# Patient Record
Sex: MALE | Race: WHITE | NOT HISPANIC OR LATINO | Employment: OTHER | URBAN - METROPOLITAN AREA
[De-identification: names, ages, dates, MRNs, and addresses within clinical notes are randomized per-mention and may not be internally consistent; named-entity substitution may affect disease eponyms.]

---

## 2017-04-24 ENCOUNTER — HOSPITAL ENCOUNTER (EMERGENCY)
Facility: CLINIC | Age: 67
Discharge: HOME | End: 2017-04-24
Attending: FAMILY MEDICINE | Admitting: FAMILY MEDICINE
Payer: MEDICARE

## 2017-04-24 VITALS
OXYGEN SATURATION: 92 % | HEART RATE: 101 BPM | DIASTOLIC BLOOD PRESSURE: 96 MMHG | RESPIRATION RATE: 20 BRPM | SYSTOLIC BLOOD PRESSURE: 134 MMHG

## 2017-04-24 DIAGNOSIS — R04.0 EPISTAXIS: ICD-10-CM

## 2017-04-24 PROCEDURE — 99283 EMERGENCY DEPT VISIT LOW MDM: CPT | Performed by: FAMILY MEDICINE

## 2017-04-24 PROCEDURE — 99283 EMERGENCY DEPT VISIT LOW MDM: CPT

## 2017-04-24 RX ORDER — TRAZODONE HYDROCHLORIDE 50 MG/1
50 TABLET, FILM COATED ORAL AT BEDTIME
COMMUNITY
Start: 2016-09-09

## 2017-04-24 RX ORDER — ATORVASTATIN CALCIUM 40 MG/1
40 TABLET, FILM COATED ORAL DAILY
COMMUNITY
Start: 2016-12-08

## 2017-04-24 NOTE — ED AVS SNAPSHOT
[facility] Emergency Department    [address] [address]    Phone:  [phone]    Fax:  [phone]                                       Stuart Ortega   MRN: [id number]    Department:  [facility] Emergency Department   Date of Visit:  4/24/2017           Patient Information     Date Of Birth          1950        Your diagnoses for this visit were:     Epistaxis        You were seen by Cameron Timmons MD.        Discharge Instructions       Return to the Emergency Room if the following occurs:     Worsened bleeding, fainting, new chest pain, new shortness of breath, or for any concern at anytime.    Or, follow-up with the following provider as we discussed:     Consider follow up with ENT if bleeding continues to recur.  Call [phone] to schedule.    Medications discussed:    For new nasal bleeding, apply the clip to the soft portion of your nose for 15-20 minutes.  When complete, removed the clip and check for bleeding.  If the bleeding continues, reapply the clip and wait another 15-20 minutes.  If the bleeding continues beyond that time, return to the ER as suggested above.    If you received pain-relieving or sedating medication during your time in the ER, avoid alcohol, driving automobiles, or working with machinery.  Also, a responsible adult must stay with you.      If you had X-rays or labs done we will attempt to contact you if there is a change needed in your care.      Call the Nurse Advice Line at [phone] or [phone] for any concern at anytime.      24 Hour Appointment Hotline       To make an appointment at any [facility], call [phone] ([phone]). If you don't have a family doctor or clinic, we will help you find one. [facility] are conveniently located to serve the needs of you and your family.             Review of your medicines      Our records show that you are taking the medicines listed below. If these are  incorrect, please call your family doctor or clinic.        Dose / Directions Last dose taken    aspirin 81 MG tablet        1 TABLET DAILY   Refills:  0        CHLORPHENIRAMINE MALEATE CR 8 MG Tbcr        1 TABLET EVERY 8 TO 12 HOURS AS NEEDED   Refills:  0        FIBER PO        3 capsules three times daily   Refills:  0        FLEXERIL 10 MG tablet   Generic drug:  cyclobenzaprine        1 TABLET 3 TIMES DAILY   Refills:  0        FLONASE 50 MCG/DOSE nasal spray   Quantity:  1 Container        INHALE 2 SPRAYS IN EACH NOSTRIL ONCE DAILY return to clinic for refills   Refills:  0        fluocinonide 0.05 % ointment   Commonly known as:  LIDEX   Quantity:  30 g        Apply topically 2 times daily > 1 year since seen in Derm Clinic last on 10-. Needs to be seen before any further refills authorized. [phone] to schedule. Thanks.   Refills:  0        HYDROmorphone 2 MG tablet   Commonly known as:  DILAUDID   Dose:  2 mg   Quantity:  20 tablet        Take 1 tablet (2 mg) by mouth every 4 hours as needed for moderate to severe pain   Refills:  0        IMITREX 50 MG tablet   Generic drug:  SUMAtriptan        1 TABLET 1 TIME ONLY,REPEAT AFTER 2 HR AS NEEDED   Refills:  0        MULTIDAY VITES Tabs        1 tablet daily   Refills:  0        simvastatin 20 MG tablet   Commonly known as:  ZOCOR        1 TABLET AT BEDTIME   Refills:  0        VER[facility]AMIL [facility] 240 MG OR        1 tablet daily   Refills:  0                Orders Needing Specimen Collection     None      Pending Results     No orders found from 4/22/2017 to 4/25/2017.            Pending Culture Results     No orders found from 4/22/2017 to 4/25/2017.            Test Results From Your Hospital Stay               Thank you for choosing [facility]       Thank you for choosing [facility] for your care. Our goal is always to provide you with excellent care. Hearing back from our patients is one way we can continue to improve our services. Please take a  few minutes to complete the written survey that you may receive in the mail after you visit with us. Thank you!        [facility] Information     [facility] gives you secure access to your electronic health record. If you see a primary care provider, you can also send messages to your care team and make appointments. If you have questions, please call your primary care clinic.  If you do not have a primary care provider, please call [phone] and they will assist you.        Care EveryWhere ID     This is your Care EveryWhere ID. This could be used by other organizations to access your [facility] medical records  [id number]        After Visit Summary       This is your record. Keep this with you and show to your community pharmacist(s) and doctor(s) at your next visit.

## 2017-04-24 NOTE — ED AVS SNAPSHOT
[facility] Emergency Department    [address] [address]    Phone:  [phone]    Fax:  [phone]                                       Stuart Ortega   MRN: [id number]    Department:  [facility] Emergency Department   Date of Visit:  4/24/2017           After Visit Summary Signature Page     I have received my discharge instructions, and my questions have been answered. I have discussed any challenges I see with this plan with the nurse or doctor.    ..........................................................................................................................................  Patient/Patient Representative Signature      ..........................................................................................................................................  Patient Representative Print Name and Relationship to Patient    ..................................................               ................................................  Date                                            Time    ..........................................................................................................................................  Reviewed by Signature/Title    ...................................................              ..............................................  Date                                                            Time

## 2017-04-25 NOTE — DISCHARGE INSTRUCTIONS
Return to the Emergency Room if the following occurs:     Worsened bleeding, fainting, new chest pain, new shortness of breath, or for any concern at anytime.    Or, follow-up with the following provider as we discussed:     Consider follow up with ENT if bleeding continues to recur.  Call [phone] to schedule.    Medications discussed:    For new nasal bleeding, apply the clip to the soft portion of your nose for 15-20 minutes.  When complete, removed the clip and check for bleeding.  If the bleeding continues, reapply the clip and wait another 15-20 minutes.  If the bleeding continues beyond that time, return to the ER as suggested above.    If you received pain-relieving or sedating medication during your time in the ER, avoid alcohol, driving automobiles, or working with machinery.  Also, a responsible adult must stay with you.      If you had X-rays or labs done we will attempt to contact you if there is a change needed in your care.      Call the Nurse Advice Line at [phone] or [phone] for any concern at anytime.

## 2017-04-25 NOTE — ED PROVIDER NOTES
HPI  Patient is a 67-year-old male presenting with epistaxis.  He denies having had epistaxis previously.  He does not take medication for anticoagulation.  No known liver disease.  He does not drink alcohol.  He does have seasonal allergies and has been using Flonase once a day over the past few weeks.  No recent rhinorrhea or congestion.  He does not pick his nose.    The patient was sitting in his easy chair when he recognized that something was running down the back of his throat.  As he sat up, the bleeding came out of the front of his nose, left side.  He reports small clots seen.  No coughing up blood or vomiting blood.  No nausea currently.  No abdominal pain.  No fainting.  No chest pain.  No shortness breath.    ROS: Ten point review of systems negative other than that noted above.  PMH: Reviewed.  SH: Reviewed.  FH: Reviewed.      PHYSICAL  BP (!) 145/96  Pulse 101  Resp 20  SpO2 97%  General: Patient is alert and in mild to moderate distress.  Neurological: Alert.  Moving upper and lower extremities equally, bilaterally.  Head / Neck: Atraumatic.  Ears: Not done.  Eyes: Pupils are equal, round, and reactive.  Normal conjunctiva.  Nose: Midline.  Small amounts of dried blood around the left naris.  No scabs or clot seen inside the nose, especially along the septum.  No polyps visualized.  Mouth / Throat: No evidence of active bleeding.  Moist. Respiratory: No respiratory distress. Cardiovascular: Regular rhythm.  Peripheral extremities are warm.    Abdomen / Pelvis: Not done. Genitalia: Not done.  Musculoskeletal: Not done.  Skin: No evidence of rash or trauma.        PHYSICIAN  Patient had a nasal clip placed on arrival.  Immediately after the clip was placed, the bleeding stopped.  The clip has been in place for about 30 minutes as I entered the room.  When I removed the clip, there is no active bleeding.  I cannot see an actual site of bleeding either.  I recommended he take the clip home with him  and applied as needed.  If recurrent bleeding occurs but is stopped with a nasal clip, he will return to ENT.  If recurrent bleeding occurs and it is not stopped by the clip he will return here.    PROCEDURE  None.       IMPRESSION    ICD-10-CM    1. Epistaxis R04.0                 Cameron Timmons MD  04/24/17 8533

## 2019-04-16 ENCOUNTER — OFFICE VISIT (OUTPATIENT)
Dept: DERMATOLOGY | Facility: CLINIC | Age: 69
End: 2019-04-16
Payer: MEDICARE

## 2019-04-16 VITALS — OXYGEN SATURATION: 95 % | SYSTOLIC BLOOD PRESSURE: 140 MMHG | HEART RATE: 79 BPM | DIASTOLIC BLOOD PRESSURE: 75 MMHG

## 2019-04-16 DIAGNOSIS — L91.8 SKIN TAG: ICD-10-CM

## 2019-04-16 DIAGNOSIS — L82.1 SEBORRHEIC KERATOSIS: ICD-10-CM

## 2019-04-16 DIAGNOSIS — D23.9 DERMAL NEVUS: ICD-10-CM

## 2019-04-16 DIAGNOSIS — L81.4 LENTIGO: Primary | ICD-10-CM

## 2019-04-16 DIAGNOSIS — D18.00 ANGIOMA: ICD-10-CM

## 2019-04-16 PROCEDURE — 99213 OFFICE O/P EST LOW 20 MIN: CPT | Mod: 25 | Performed by: DERMATOLOGY

## 2019-04-16 PROCEDURE — 11200 RMVL SKIN TAGS UP TO&INC 15: CPT | Performed by: DERMATOLOGY

## 2019-04-16 RX ORDER — ALBUTEROL SULFATE 90 UG/1
1-2 AEROSOL, METERED RESPIRATORY (INHALATION)
COMMUNITY
Start: 2019-02-06

## 2019-04-16 RX ORDER — ALBUTEROL SULFATE 90 UG/1
AEROSOL, METERED RESPIRATORY (INHALATION)
Status: ON HOLD | COMMUNITY
Start: 2019-04-02 | End: 2021-08-04

## 2019-04-16 NOTE — NURSING NOTE
"Initial /75   Pulse 79   SpO2 95%  Estimated body mass index is 33.47 kg/m  as calculated from the following:    Height as of 11/11/15: 1.803 m (5' 11\").    Weight as of 11/17/14: 108.9 kg (240 lb). .      "

## 2019-04-16 NOTE — LETTER
2019         RE: Stuart Ortega  [address]  [place] [address]        Dear Colleague,    Thank you for referring your patient, Stuart Ortega, to the [facility]. Please see a copy of my visit note below.    Stuart Ortega is a 68 year old year old male patient here today for tags on neck  .  Patient states this has been present for a while.  Patient reports the following symptoms:  itching.  Patient reports the following previous treatments none.  Patient reports the following modifying factors none.  Associated symptoms: none.  Patient has no other skin complaints today.  Remainder of the HPI, Meds, PMH, Allergies, FH, and SH was reviewed in chart.    No past medical history on file.    Past Surgical History:   Procedure Laterality Date     ROTATOR CUFF REPAIR RT/LT      right shoulder     SURGICAL HISTORY OF -       Parotid tumor--benign        Family History   Problem Relation Age of Onset     Alzheimer Disease Father      Prostate Cancer Father         at age 74     Cardiovascular Paternal Grandfather      Hypertension Brother      Lipids Brother      Lipids Son      Lipids Daughter        Social History     Socioeconomic History     Marital status:      Spouse name: Not on file     Number of children: Not on file     Years of education: Not on file     Highest education level: Not on file   Occupational History     Employer: RETIRED   Social Needs     Financial resource strain: Not on file     Food insecurity:     Worry: Not on file     Inability: Not on file     Transportation needs:     Medical: Not on file     Non-medical: Not on file   Tobacco Use     Smoking status: Former Smoker     Last attempt to quit: 1984     Years since quittin.3     Smokeless tobacco: Never Used   Substance and Sexual Activity     Alcohol use: No     Drug use: No     Sexual activity: Yes     Partners: Female     Birth control/protection: Surgical     Comment: wife  tubal/hysterectomy   Lifestyle     Physical activity:     Days per week: Not on file     Minutes per session: Not on file     Stress: Not on file   Relationships     Social connections:     Talks on phone: Not on file     Gets together: Not on file     Attends [employer] service: Not on file     Active member of club or organization: Not on file     Attends meetings of clubs or organizations: Not on file     Relationship status: Not on file     Intimate partner violence:     Fear of current or ex partner: Not on file     Emotionally abused: Not on file     Physically abused: Not on file     Forced sexual activity: Not on file   Other Topics Concern     Not on file   Social History Narrative     Not on file       Outpatient Encounter Medications as of 4/16/2019   Medication Sig Dispense Refill     albuterol (PROVENTIL HFA) 108 (90 Base) MCG/ACT inhaler Inhale 1-2 puffs into the lungs       ASPIRIN 81 MG OR TABS 1 TABLET DAILY       atorvastatin (LIPITOR) 40 MG tablet Take 40 mg by mouth daily       CHLORPHENIRAMINE MALEATE CR 8 MG OR TBCR 1 TABLET EVERY 8 TO 12 HOURS AS NEEDED       FIBER OR 3 capsules three times daily       FLEXERIL 10 MG OR TABS 1 TABLET 3 TIMES DAILY       FLONASE INHA 50 MCG/DOSE NA INHALE 2 SPRAYS IN EACH NOSTRIL ONCE DAILY return to clinic for refills 1 Container 0     fluocinonide (LIDEX) 0.05 % ointment Apply topically 2 times daily > 1 year since seen in Derm Clinic last on 10-. Needs to be seen before any further refills authorized. [phone] to schedule. Thanks. 30 g 0     IMITREX 50 MG OR TABS 1 TABLET 1 TIME ONLY,REPEAT AFTER 2 HR AS NEEDED       MULTIDAY VITES OR TABS 1 tablet daily       sertraline (ZOLOFT) 50 MG tablet TAKE ONE TABLET BY MOUTH EVERY DAY       SIMVASTATIN 20 MG OR TABS 1 TABLET AT BEDTIME       traZODone (DESYREL) 50 MG tablet Take 50 mg by mouth At Bedtime       VENTOLIN  (90 Base) MCG/ACT inhaler        VERAPAMIL HCL [facility]### 240 MG OR 1 capsule by  mouth daily       No facility-administered encounter medications on file as of 4/16/2019.              Review Of Systems  Skin: As above  Eyes: negative  Ears/Nose/Throat: negative  Respiratory: No shortness of breath, dyspnea on exertion, cough, or hemoptysis  Cardiovascular: negative  Gastrointestinal: negative  Genitourinary: negative  Musculoskeletal: negative  Neurologic: negative  Psychiatric: negative  Hematologic/Lymphatic/Immunologic: negative  Endocrine: negative      O:   NAD, WDWN, Alert & Oriented, Mood & Affect wnl, Vitals stable   Here today alone   There were no vitals taken for this visit.   General appearance normal   Vitals stable   Alert, oriented and in no acute distress      Following lymph nodes palpated: Occipital, Cervical, Supraclavicular no lad   Tags onneck      Stuck on papules and brown macules on trunk and ext   Red papules on trunk  Flesh colored papules on trunk     The remainder of the full exam was unremarkable; the following areas were examined:  conjunctiva/lids, oral mucosa, neck, peripheral vascular system, abdomen, lymph nodes, digits/nails, eccrine and apocrine glands, scalp/hair, face, neck, chest, abdomen, buttocks, back, RUE, LUE, RLE, LLE       Eyes: Conjunctivae/lids:Normal     ENT: Lips, buccal mucosa, tongue: normal    MSK:Normal    Cardiovascular: peripheral edema none    Pulm: Breathing Normal    Lymph Nodes: No Head and Neck Lymphadenopathy     Neuro/Psych: Orientation:Normal; Mood/Affect:Normal      A/P:  1. Seborrheic keratosis, lentigo, angioma, dermal nevus  2. l neck 10 tags  LN2:  Treated with LN2 for 5s for 1-2 cycles. Warned risks of blistering, pain, pigment change, scarring, and incomplete resolution.  Advised patient to return if lesions do not completely resolve.  Wound care sheet given.    BENIGN LESIONS DISCUSSED WITH PATIENT:  I discussed the specifics of tumor, prognosis, and genetics of benign lesions.  I explained that treatment of these lesions  would be purely cosmetic and not medically neccessary.  I discussed with patient different removal options including excision, cautery and /or laser.      Nature and genetics of benign skin lesions dicussed with patient.  Signs and Symptoms of skin cancer discussed with patient.  Patient encouraged to perform monthly skin exams.  UV precautions reviewed with patient.  Patient to follow up with Primary Care provider regarding elevated blood pressure.  Skin care regimen reviewed with patient: Eliminate harsh soaps, i.e. Dial, zest, irsih spring; Mild soaps such as Cetaphil or Dove sensitive skin, avoid hot or cold showers, aggressive use of emollients including vanicream, cetaphil or cerave discussed with patient.    Risks of non-melanoma skin cancer discussed with patient   Return to clinic 12 months  Stuart to follow up with Primary Care provider regarding elevated blood pressure.      Again, thank you for allowing me to participate in the care of your patient.        Sincerely,        Sage Gerard MD

## 2019-04-16 NOTE — PROGRESS NOTES
Stuart Ortega is a 68 year old year old male patient here today for tags on neck  .  Patient states this has been present for a while.  Patient reports the following symptoms:  itching.  Patient reports the following previous treatments none.  Patient reports the following modifying factors none.  Associated symptoms: none.  Patient has no other skin complaints today.  Remainder of the HPI, Meds, PMH, Allergies, FH, and SH was reviewed in chart.    No past medical history on file.    Past Surgical History:   Procedure Laterality Date     ROTATOR CUFF REPAIR RT/LT      right shoulder     SURGICAL HISTORY OF -       Parotid tumor--benign        Family History   Problem Relation Age of Onset     Alzheimer Disease Father      Prostate Cancer Father         at age 74     Cardiovascular Paternal Grandfather      Hypertension Brother      Lipids Brother      Lipids Son      Lipids Daughter        Social History     Socioeconomic History     Marital status:      Spouse name: Not on file     Number of children: Not on file     Years of education: Not on file     Highest education level: Not on file   Occupational History     Employer: RETIRED   Social Needs     Financial resource strain: Not on file     Food insecurity:     Worry: Not on file     Inability: Not on file     Transportation needs:     Medical: Not on file     Non-medical: Not on file   Tobacco Use     Smoking status: Former Smoker     Last attempt to quit: 1984     Years since quittin.3     Smokeless tobacco: Never Used   Substance and Sexual Activity     Alcohol use: No     Drug use: No     Sexual activity: Yes     Partners: Female     Birth control/protection: Surgical     Comment: wife tubal/hysterectomy   Lifestyle     Physical activity:     Days per week: Not on file     Minutes per session: Not on file     Stress: Not on file   Relationships     Social connections:     Talks on phone: Not on file     Gets together: Not on file      Attends [employer] service: Not on file     Active member of club or organization: Not on file     Attends meetings of clubs or organizations: Not on file     Relationship status: Not on file     Intimate partner violence:     Fear of current or ex partner: Not on file     Emotionally abused: Not on file     Physically abused: Not on file     Forced sexual activity: Not on file   Other Topics Concern     Not on file   Social History Narrative     Not on file       Outpatient Encounter Medications as of 4/16/2019   Medication Sig Dispense Refill     albuterol (PROVENTIL HFA) 108 (90 Base) MCG/ACT inhaler Inhale 1-2 puffs into the lungs       ASPIRIN 81 MG OR TABS 1 TABLET DAILY       atorvastatin (LIPITOR) 40 MG tablet Take 40 mg by mouth daily       CHLORPHENIRAMINE MALEATE CR 8 MG OR TBCR 1 TABLET EVERY 8 TO 12 HOURS AS NEEDED       FIBER OR 3 capsules three times daily       FLEXERIL 10 MG OR TABS 1 TABLET 3 TIMES DAILY       FLONASE INHA 50 MCG/DOSE NA INHALE 2 SPRAYS IN EACH NOSTRIL ONCE DAILY return to clinic for refills 1 Container 0     fluocinonide (LIDEX) 0.05 % ointment Apply topically 2 times daily > 1 year since seen in Derm Clinic last on 10-. Needs to be seen before any further refills authorized. [phone] to schedule. Thanks. 30 g 0     IMITREX 50 MG OR TABS 1 TABLET 1 TIME ONLY,REPEAT AFTER 2 HR AS NEEDED       MULTIDAY VITES OR TABS 1 tablet daily       sertraline (ZOLOFT) 50 MG tablet TAKE ONE TABLET BY MOUTH EVERY DAY       SIMVASTATIN 20 MG OR TABS 1 TABLET AT BEDTIME       traZODone (DESYREL) 50 MG tablet Take 50 mg by mouth At Bedtime       VENTOLIN  (90 Base) MCG/ACT inhaler        VERAPAMIL HCL CPCR### 240 MG OR 1 capsule by mouth daily       No facility-administered encounter medications on file as of 4/16/2019.              Review Of Systems  Skin: As above  Eyes: negative  Ears/Nose/Throat: negative  Respiratory: No shortness of breath, dyspnea on exertion, cough, or  hemoptysis  Cardiovascular: negative  Gastrointestinal: negative  Genitourinary: negative  Musculoskeletal: negative  Neurologic: negative  Psychiatric: negative  Hematologic/Lymphatic/Immunologic: negative  Endocrine: negative      O:   NAD, WDWN, Alert & Oriented, Mood & Affect wnl, Vitals stable   Here today alone   There were no vitals taken for this visit.   General appearance normal   Vitals stable   Alert, oriented and in no acute distress      Following lymph nodes palpated: Occipital, Cervical, Supraclavicular no lad   Tags onneck      Stuck on papules and brown macules on trunk and ext   Red papules on trunk  Flesh colored papules on trunk     The remainder of the full exam was unremarkable; the following areas were examined:  conjunctiva/lids, oral mucosa, neck, peripheral vascular system, abdomen, lymph nodes, digits/nails, eccrine and apocrine glands, scalp/hair, face, neck, chest, abdomen, buttocks, back, RUE, LUE, RLE, LLE       Eyes: Conjunctivae/lids:Normal     ENT: Lips, buccal mucosa, tongue: normal    MSK:Normal    Cardiovascular: peripheral edema none    Pulm: Breathing Normal    Lymph Nodes: No Head and Neck Lymphadenopathy     Neuro/Psych: Orientation:Normal; Mood/Affect:Normal      A/P:  1. Seborrheic keratosis, lentigo, angioma, dermal nevus  2. l neck 10 tags  LN2:  Treated with LN2 for 5s for 1-2 cycles. Warned risks of blistering, pain, pigment change, scarring, and incomplete resolution.  Advised patient to return if lesions do not completely resolve.  Wound care sheet given.    BENIGN LESIONS DISCUSSED WITH PATIENT:  I discussed the specifics of tumor, prognosis, and genetics of benign lesions.  I explained that treatment of these lesions would be purely cosmetic and not medically neccessary.  I discussed with patient different removal options including excision, cautery and /or laser.      Nature and genetics of benign skin lesions dicussed with patient.  Signs and Symptoms of skin  cancer discussed with patient.  Patient encouraged to perform monthly skin exams.  UV precautions reviewed with patient.  Patient to follow up with Primary Care provider regarding elevated blood pressure.  Skin care regimen reviewed with patient: Eliminate harsh soaps, i.e. Dial, zest, irsih spring; Mild soaps such as Cetaphil or Dove sensitive skin, avoid hot or cold showers, aggressive use of emollients including vanicream, cetaphil or cerave discussed with patient.    Risks of non-melanoma skin cancer discussed with patient   Return to clinic 12 months  Stuart to follow up with Primary Care provider regarding elevated blood pressure.

## 2021-08-03 ENCOUNTER — HOSPITAL ENCOUNTER (INPATIENT)
Facility: CLINIC | Age: 71
LOS: 5 days | Discharge: HOME | DRG: 390 | End: 2021-08-09
Attending: EMERGENCY MEDICINE | Admitting: INTERNAL MEDICINE
Payer: MEDICARE

## 2021-08-03 DIAGNOSIS — K56.609 SMALL BOWEL OBSTRUCTION (H): ICD-10-CM

## 2021-08-03 DIAGNOSIS — Z11.52 ENCOUNTER FOR SCREENING LABORATORY TESTING FOR SEVERE ACUTE RESPIRATORY SYNDROME CORONAVIRUS 2 (SARS-COV-2): ICD-10-CM

## 2021-08-03 PROCEDURE — C9803 HOPD COVID-19 SPEC COLLECT: HCPCS | Performed by: EMERGENCY MEDICINE

## 2021-08-03 PROCEDURE — 99285 EMERGENCY DEPT VISIT HI MDM: CPT | Mod: 25 | Performed by: EMERGENCY MEDICINE

## 2021-08-03 PROCEDURE — 99285 EMERGENCY DEPT VISIT HI MDM: CPT | Performed by: EMERGENCY MEDICINE

## 2021-08-03 RX ORDER — HYDROMORPHONE HYDROCHLORIDE 1 MG/ML
.3-.5 INJECTION, SOLUTION INTRAMUSCULAR; INTRAVENOUS; SUBCUTANEOUS
Status: COMPLETED | OUTPATIENT
Start: 2021-08-03 | End: 2021-08-05

## 2021-08-03 RX ORDER — ONDANSETRON 2 MG/ML
4 INJECTION INTRAMUSCULAR; INTRAVENOUS EVERY 30 MIN PRN
Status: COMPLETED | OUTPATIENT
Start: 2021-08-03 | End: 2021-08-04

## 2021-08-04 ENCOUNTER — APPOINTMENT (OUTPATIENT)
Dept: GENERAL RADIOLOGY | Facility: CLINIC | Age: 71
DRG: 390 | End: 2021-08-04
Attending: SURGERY
Payer: MEDICARE

## 2021-08-04 ENCOUNTER — APPOINTMENT (OUTPATIENT)
Dept: CT IMAGING | Facility: CLINIC | Age: 71
DRG: 390 | End: 2021-08-04
Attending: EMERGENCY MEDICINE
Payer: MEDICARE

## 2021-08-04 PROBLEM — R73.9 HYPERGLYCEMIA: Status: ACTIVE | Noted: 2021-08-04

## 2021-08-04 PROBLEM — R74.8 ELEVATED LIPASE: Status: ACTIVE | Noted: 2021-08-04

## 2021-08-04 PROBLEM — K56.609 SMALL BOWEL OBSTRUCTION (H): Status: ACTIVE | Noted: 2021-08-04

## 2021-08-04 PROBLEM — H90.6 MIXED CONDUCTIVE AND SENSORINEURAL HEARING LOSS OF BOTH EARS: Status: ACTIVE | Noted: 2019-05-30

## 2021-08-04 PROBLEM — R79.89 ELEVATED LACTIC ACID LEVEL: Status: ACTIVE | Noted: 2021-08-04

## 2021-08-04 PROBLEM — M19.90 OSTEOARTHRITIS: Chronic | Status: ACTIVE | Noted: 2021-08-04

## 2021-08-04 LAB
ALBUMIN SERPL-MCNC: 3.3 G/DL (ref 3.4–5)
ALP SERPL-CCNC: 83 U/L (ref 40–150)
ALT SERPL W P-5'-P-CCNC: 33 U/L (ref 0–70)
ANION GAP SERPL CALCULATED.3IONS-SCNC: 9 MMOL/L (ref 3–14)
AST SERPL W P-5'-P-CCNC: 23 U/L (ref 0–45)
BASOPHILS # BLD MANUAL: 0 10E3/UL (ref 0–0.2)
BASOPHILS NFR BLD MANUAL: 0 %
BILIRUB SERPL-MCNC: 1.2 MG/DL (ref 0.2–1.3)
BUN SERPL-MCNC: 32 MG/DL (ref 7–30)
CALCIUM SERPL-MCNC: 8.4 MG/DL (ref 8.5–10.1)
CHLORIDE BLD-SCNC: 102 MMOL/L (ref 94–109)
CO2 SERPL-SCNC: 24 MMOL/L (ref 20–32)
CREAT SERPL-MCNC: 1.17 MG/DL (ref 0.66–1.25)
EOSINOPHIL # BLD MANUAL: 0.1 10E3/UL (ref 0–0.7)
EOSINOPHIL NFR BLD MANUAL: 1 %
ERYTHROCYTE [DISTWIDTH] IN BLOOD BY AUTOMATED COUNT: 13.1 % (ref 10–15)
GFR SERPL CREATININE-BSD FRML MDRD: 61 ML/MIN/1.73M2
GLUCOSE BLD-MCNC: 183 MG/DL (ref 70–99)
HCT VFR BLD AUTO: 41.8 % (ref 40–53)
HGB BLD-MCNC: 14.6 G/DL (ref 13.3–17.7)
LACTATE SERPL-SCNC: 2.5 MMOL/L (ref 0.7–2)
LIPASE SERPL-CCNC: 43 U/L (ref 73–393)
LYMPHOCYTES # BLD MANUAL: 0.9 10E3/UL (ref 0.8–5.3)
LYMPHOCYTES NFR BLD MANUAL: 12 %
MCH RBC QN AUTO: 31.5 PG (ref 26.5–33)
MCHC RBC AUTO-ENTMCNC: 34.9 G/DL (ref 31.5–36.5)
MCV RBC AUTO: 90 FL (ref 78–100)
METAMYELOCYTES # BLD MANUAL: 0.5 10E3/UL
METAMYELOCYTES NFR BLD MANUAL: 6 %
MONOCYTES # BLD MANUAL: 0.7 10E3/UL (ref 0–1.3)
MONOCYTES NFR BLD MANUAL: 9 %
MYELOCYTES # BLD MANUAL: 0.1 10E3/UL
MYELOCYTES NFR BLD MANUAL: 1 %
NEUTROPHILS # BLD MANUAL: 5.5 10E3/UL (ref 1.6–8.3)
NEUTROPHILS NFR BLD MANUAL: 71 %
PLAT MORPH BLD: ABNORMAL
PLATELET # BLD AUTO: 181 10E3/UL (ref 150–450)
POTASSIUM BLD-SCNC: 3.6 MMOL/L (ref 3.4–5.3)
PROT SERPL-MCNC: 7 G/DL (ref 6.8–8.8)
RBC # BLD AUTO: 4.63 10E6/UL (ref 4.4–5.9)
RBC MORPH BLD: ABNORMAL
SARS-COV-2 RNA RESP QL NAA+PROBE: NEGATIVE
SODIUM SERPL-SCNC: 135 MMOL/L (ref 133–144)
WBC # BLD AUTO: 7.7 10E3/UL (ref 4–11)

## 2021-08-04 PROCEDURE — 96365 THER/PROPH/DIAG IV INF INIT: CPT | Mod: 59 | Performed by: EMERGENCY MEDICINE

## 2021-08-04 PROCEDURE — 74177 CT ABD & PELVIS W/CONTRAST: CPT | Mod: ME

## 2021-08-04 PROCEDURE — 999N000065 XR ABDOMEN PORT 1 VIEWS

## 2021-08-04 PROCEDURE — 250N000011 HC RX IP 250 OP 636: Performed by: EMERGENCY MEDICINE

## 2021-08-04 PROCEDURE — 120N000001 HC R&B MED SURG/OB

## 2021-08-04 PROCEDURE — 36415 COLL VENOUS BLD VENIPUNCTURE: CPT | Performed by: EMERGENCY MEDICINE

## 2021-08-04 PROCEDURE — 83690 ASSAY OF LIPASE: CPT | Performed by: EMERGENCY MEDICINE

## 2021-08-04 PROCEDURE — 99207 PR NOT IN PERSON INPATIENT CONSULT STATISTICAL MARKER: CPT | Performed by: INTERNAL MEDICINE

## 2021-08-04 PROCEDURE — 96375 TX/PRO/DX INJ NEW DRUG ADDON: CPT | Performed by: EMERGENCY MEDICINE

## 2021-08-04 PROCEDURE — 99222 1ST HOSP IP/OBS MODERATE 55: CPT | Mod: AI | Performed by: INTERNAL MEDICINE

## 2021-08-04 PROCEDURE — 87635 SARS-COV-2 COVID-19 AMP PRB: CPT | Performed by: EMERGENCY MEDICINE

## 2021-08-04 PROCEDURE — 96376 TX/PRO/DX INJ SAME DRUG ADON: CPT | Performed by: EMERGENCY MEDICINE

## 2021-08-04 PROCEDURE — 83605 ASSAY OF LACTIC ACID: CPT | Performed by: EMERGENCY MEDICINE

## 2021-08-04 PROCEDURE — 74018 RADEX ABDOMEN 1 VIEW: CPT

## 2021-08-04 PROCEDURE — 85027 COMPLETE CBC AUTOMATED: CPT | Performed by: EMERGENCY MEDICINE

## 2021-08-04 PROCEDURE — 250N000009 HC RX 250: Performed by: EMERGENCY MEDICINE

## 2021-08-04 PROCEDURE — 99221 1ST HOSP IP/OBS SF/LOW 40: CPT | Performed by: SURGERY

## 2021-08-04 PROCEDURE — 250N000013 HC RX MED GY IP 250 OP 250 PS 637: Performed by: INTERNAL MEDICINE

## 2021-08-04 PROCEDURE — 80053 COMPREHEN METABOLIC PANEL: CPT | Performed by: EMERGENCY MEDICINE

## 2021-08-04 RX ORDER — IOPAMIDOL 755 MG/ML
100 INJECTION, SOLUTION INTRAVASCULAR ONCE
Status: COMPLETED | OUTPATIENT
Start: 2021-08-04 | End: 2021-08-04

## 2021-08-04 RX ORDER — SODIUM CHLORIDE 9 MG/ML
1000 INJECTION, SOLUTION INTRAVENOUS CONTINUOUS
Status: DISCONTINUED | OUTPATIENT
Start: 2021-08-04 | End: 2021-08-04

## 2021-08-04 RX ORDER — DOXAZOSIN 2 MG/1
2 TABLET ORAL AT BEDTIME
COMMUNITY
Start: 2021-03-05

## 2021-08-04 RX ORDER — CALCIUM CARBONATE 500 MG/1
500 TABLET, CHEWABLE ORAL DAILY PRN
Status: DISCONTINUED | OUTPATIENT
Start: 2021-08-04 | End: 2021-08-09 | Stop reason: HOSPADM

## 2021-08-04 RX ORDER — ALBUTEROL SULFATE 90 UG/1
1-2 AEROSOL, METERED RESPIRATORY (INHALATION) EVERY 4 HOURS PRN
Status: DISCONTINUED | OUTPATIENT
Start: 2021-08-04 | End: 2021-08-09 | Stop reason: HOSPADM

## 2021-08-04 RX ORDER — VERAPAMIL HYDROCHLORIDE 120 MG/1
2 TABLET, FILM COATED, EXTENDED RELEASE ORAL EVERY EVENING
COMMUNITY
Start: 2021-05-31

## 2021-08-04 RX ORDER — ONDANSETRON 4 MG/1
4 TABLET, ORALLY DISINTEGRATING ORAL EVERY 6 HOURS PRN
Status: DISCONTINUED | OUTPATIENT
Start: 2021-08-04 | End: 2021-08-09 | Stop reason: HOSPADM

## 2021-08-04 RX ORDER — SUMATRIPTAN 50 MG/1
50 TABLET, FILM COATED ORAL
Status: DISCONTINUED | OUTPATIENT
Start: 2021-08-04 | End: 2021-08-09 | Stop reason: HOSPADM

## 2021-08-04 RX ORDER — FAMOTIDINE 40 MG/1
40 TABLET, FILM COATED ORAL DAILY
COMMUNITY
Start: 2021-07-18

## 2021-08-04 RX ORDER — TRAZODONE HYDROCHLORIDE 50 MG/1
50 TABLET, FILM COATED ORAL AT BEDTIME
Status: DISCONTINUED | OUTPATIENT
Start: 2021-08-04 | End: 2021-08-09 | Stop reason: HOSPADM

## 2021-08-04 RX ORDER — ATORVASTATIN CALCIUM 20 MG/1
40 TABLET, FILM COATED ORAL DAILY
Status: DISCONTINUED | OUTPATIENT
Start: 2021-08-04 | End: 2021-08-09 | Stop reason: HOSPADM

## 2021-08-04 RX ORDER — MAGNESIUM HYDROXIDE/ALUMINUM HYDROXICE/SIMETHICONE 120; 1200; 1200 MG/30ML; MG/30ML; MG/30ML
30 SUSPENSION ORAL EVERY 4 HOURS PRN
Status: DISCONTINUED | OUTPATIENT
Start: 2021-08-04 | End: 2021-08-09 | Stop reason: HOSPADM

## 2021-08-04 RX ORDER — ONDANSETRON 2 MG/ML
4 INJECTION INTRAMUSCULAR; INTRAVENOUS EVERY 6 HOURS PRN
Status: DISCONTINUED | OUTPATIENT
Start: 2021-08-04 | End: 2021-08-09 | Stop reason: HOSPADM

## 2021-08-04 RX ORDER — FUROSEMIDE 20 MG
20 TABLET ORAL DAILY
COMMUNITY
Start: 2021-03-05 | End: 2022-03-05

## 2021-08-04 RX ORDER — DEXTROSE, SODIUM CHLORIDE, SODIUM LACTATE, POTASSIUM CHLORIDE, AND CALCIUM CHLORIDE 5; .6; .31; .03; .02 G/100ML; G/100ML; G/100ML; G/100ML; G/100ML
INJECTION, SOLUTION INTRAVENOUS CONTINUOUS
Status: DISCONTINUED | OUTPATIENT
Start: 2021-08-04 | End: 2021-08-09 | Stop reason: HOSPADM

## 2021-08-04 RX ADMIN — SODIUM CHLORIDE, SODIUM LACTATE, POTASSIUM CHLORIDE, CALCIUM CHLORIDE AND DEXTROSE MONOHYDRATE: 5; 600; 310; 30; 20 INJECTION, SOLUTION INTRAVENOUS at 06:00

## 2021-08-04 RX ADMIN — HYDROMORPHONE HYDROCHLORIDE 0.5 MG: 1 INJECTION, SOLUTION INTRAMUSCULAR; INTRAVENOUS; SUBCUTANEOUS at 00:22

## 2021-08-04 RX ADMIN — TRAZODONE HYDROCHLORIDE 50 MG: 50 TABLET ORAL at 22:06

## 2021-08-04 RX ADMIN — ATORVASTATIN CALCIUM 40 MG: 20 TABLET, FILM COATED ORAL at 11:34

## 2021-08-04 RX ADMIN — SERTRALINE HYDROCHLORIDE 50 MG: 50 TABLET ORAL at 11:34

## 2021-08-04 RX ADMIN — ONDANSETRON 4 MG: 2 INJECTION INTRAMUSCULAR; INTRAVENOUS at 00:25

## 2021-08-04 RX ADMIN — DIATRIZOATE MEGLUMINE AND DIATRIZOATE SODIUM 75 ML: 660; 100 SOLUTION ORAL; RECTAL at 21:54

## 2021-08-04 RX ADMIN — ONDANSETRON 4 MG: 2 INJECTION INTRAMUSCULAR; INTRAVENOUS at 07:53

## 2021-08-04 RX ADMIN — HYDROMORPHONE HYDROCHLORIDE 0.5 MG: 1 INJECTION, SOLUTION INTRAMUSCULAR; INTRAVENOUS; SUBCUTANEOUS at 01:33

## 2021-08-04 RX ADMIN — SODIUM CHLORIDE 69 ML: 9 INJECTION, SOLUTION INTRAVENOUS at 02:38

## 2021-08-04 RX ADMIN — IOPAMIDOL 100 ML: 755 INJECTION, SOLUTION INTRAVENOUS at 02:38

## 2021-08-04 ASSESSMENT — ENCOUNTER SYMPTOMS
NAUSEA: 1
FATIGUE: 0
SORE THROAT: 0
APPETITE CHANGE: 1
BACK PAIN: 0
DYSPHORIC MOOD: 0
FEVER: 0
SHORTNESS OF BREATH: 0
HEADACHES: 0
VOMITING: 1
LIGHT-HEADEDNESS: 0
ABDOMINAL DISTENTION: 1
WEAKNESS: 0
WOUND: 0
CONSTIPATION: 0
COUGH: 0
FLANK PAIN: 0
CHEST TIGHTNESS: 0
NECK PAIN: 0
ABDOMINAL PAIN: 1
NUMBNESS: 0
DIAPHORESIS: 0
DIARRHEA: 0

## 2021-08-04 ASSESSMENT — ACTIVITIES OF DAILY LIVING (ADL)
ADLS_ACUITY_SCORE: 17
HEARING_DIFFICULTY_OR_DEAF: NO
DRESSING/BATHING_DIFFICULTY: NO
DIFFICULTY_EATING/SWALLOWING: NO
WEAR_GLASSES_OR_BLIND: YES
CONCENTRATING,_REMEMBERING_OR_MAKING_DECISIONS_DIFFICULTY: NO
DOING_ERRANDS_INDEPENDENTLY_DIFFICULTY: NO
ADLS_ACUITY_SCORE: 17
FALL_HISTORY_WITHIN_LAST_SIX_MONTHS: NO
WALKING_OR_CLIMBING_STAIRS_DIFFICULTY: NO
TOILETING_ISSUES: NO
DIFFICULTY_COMMUNICATING: NO
PATIENT_/_FAMILY_COMMUNICATION_STYLE: SPOKEN LANGUAGE (ENGLISH OR BILINGUAL)
ADLS_ACUITY_SCORE: 17

## 2021-08-04 ASSESSMENT — MIFFLIN-ST. JEOR: SCORE: 1927.25

## 2021-08-04 NOTE — ED PROVIDER NOTES
History     Chief Complaint   Patient presents with     Abdominal Pain     Has abd distention.  Last BM 45 min PTA- loose.  Vomiting in triage.      HPI  Stuart Ortega is a 71 year old male with a history of previous abdominal hernias status post repair as well as previous spinal surgery with anterior and posterior approach presented for evaluation of abdominal pain, bloating, nausea, and vomiting.  Symptoms began relatively abruptly with pain and bloating this afternoon.  He reports abdominal distention and nausea.  Had some relief after vomiting once.  Patient also reports passing small bowel movement which was loose but did not affect his pain or bloating.  Denies fevers or chills.  No abdominal injury.  Denies chest pain or difficulty breathing.  Does have a previous history of bowel obstruction which occurred after a previous surgery.    Allergies:  Allergies   Allergen Reactions     Celebrex [Celecoxib] Anaphylaxis and Difficulty breathing     Amoxicillin Itching and Swelling     swelling in lips     Fiorinal Other (See Comments)     Burning red spots on B/L palms     Sulfa Drugs Other (See Comments)     Passes out       Problem List:    Patient Active Problem List    Diagnosis Date Noted     Small bowel obstruction (H) 08/04/2021     Priority: Medium     HYPERLIPIDEMIA LDL GOAL <130 10/31/2010     Priority: Medium     Pure hypercholesterolemia 08/22/2006     Priority: Medium     Headache 08/22/2006     Priority: Medium     Problem list name updated by automated process. Provider to review       Generalized osteoarthrosis, unspecified site 08/22/2006     Priority: Medium     Lumbar          Past Medical History:    No past medical history on file.    Past Surgical History:    Past Surgical History:   Procedure Laterality Date     ROTATOR CUFF REPAIR RT/LT  2005    right shoulder     SURGICAL HISTORY OF -   1973    Parotid tumor--benign       Family History:    Family History   Problem Relation Age of  Onset     Alzheimer Disease Father      Prostate Cancer Father         at age 74     Cardiovascular Paternal Grandfather      Hypertension Brother      Lipids Brother      Lipids Son      Lipids Daughter        Social History:  Marital Status:   [2]  Social History     Tobacco Use     Smoking status: Former Smoker     Quit date: 1984     Years since quittin.6     Smokeless tobacco: Never Used   Substance Use Topics     Alcohol use: No     Drug use: No        Medications:    albuterol (PROVENTIL HFA) 108 (90 Base) MCG/ACT inhaler  ASPIRIN 81 MG OR TABS  atorvastatin (LIPITOR) 40 MG tablet  CHLORPHENIRAMINE MALEATE CR 8 MG OR TBCR  FIBER OR  FLEXERIL 10 MG OR TABS  FLONASE INHA 50 MCG/DOSE NA  fluocinonide (LIDEX) 0.05 % ointment  IMITREX 50 MG OR TABS  MULTIDAY VITES OR TABS  sertraline (ZOLOFT) 50 MG tablet  SIMVASTATIN 20 MG OR TABS  traZODone (DESYREL) 50 MG tablet  VENTOLIN  (90 Base) MCG/ACT inhaler  VERAPAMIL HCL CPCR### 240 MG OR          Review of Systems   Constitutional: Positive for appetite change (Decreased tonight). Negative for diaphoresis, fatigue and fever.   HENT: Negative for congestion and sore throat.    Respiratory: Negative for cough, chest tightness and shortness of breath.    Cardiovascular: Negative for chest pain.   Gastrointestinal: Positive for abdominal distention, abdominal pain, nausea and vomiting. Negative for constipation and diarrhea.   Genitourinary: Negative for decreased urine volume and flank pain.   Musculoskeletal: Negative for back pain and neck pain.   Skin: Negative for rash and wound.   Neurological: Negative for weakness, light-headedness, numbness and headaches.   Psychiatric/Behavioral: Negative for dysphoric mood.   All other systems reviewed and are negative.      Physical Exam   BP: 110/62  Pulse: 85  Temp: 98.4  F (36.9  C)  Resp: 16  Weight: 108.9 kg (240 lb)  SpO2: 94 %      Physical Exam  Vitals and nursing note reviewed.   Constitutional:        Appearance: He is well-developed. He is obese. He is not ill-appearing or diaphoretic.   HENT:      Head: Atraumatic.      Nose: Nose normal.      Mouth/Throat:      Mouth: Mucous membranes are moist.   Eyes:      Conjunctiva/sclera: Conjunctivae normal.   Cardiovascular:      Rate and Rhythm: Normal rate.   Pulmonary:      Effort: Pulmonary effort is normal.   Abdominal:      General: Abdomen is protuberant. Bowel sounds are decreased. There is distension.      Palpations: Abdomen is soft.      Tenderness: There is generalized abdominal tenderness.      Comments: Abdomen protuberant with bloating but nonperitoneal   Musculoskeletal:         General: Normal range of motion.      Cervical back: Normal range of motion.   Skin:     General: Skin is warm and dry.      Capillary Refill: Capillary refill takes less than 2 seconds.   Neurological:      General: No focal deficit present.      Mental Status: He is alert and oriented to person, place, and time.   Psychiatric:         Mood and Affect: Mood normal.         ED Course        Procedures           The Lactic acid level is elevated due to a small bowel obstruction, at this time there is no sign of severe sepsis or septic shock.         Results for orders placed or performed during the hospital encounter of 08/03/21 (from the past 24 hour(s))   CBC with platelets differential    Narrative    The following orders were created for panel order CBC with platelets differential.  Procedure                               Abnormality         Status                     ---------                               -----------         ------                     CBC with platelets and d...[[id number]]  Normal              Final result               Manual Differential[[id number]]          Abnormal            Final result                 Please view results for these tests on the individual orders.   Comprehensive metabolic panel   Result Value Ref Range    Sodium 135 133 - 144  mmol/L    Potassium 3.6 3.4 - 5.3 mmol/L    Chloride 102 94 - 109 mmol/L    Carbon Dioxide (CO2) 24 20 - 32 mmol/L    Anion Gap 9 3 - 14 mmol/L    Urea Nitrogen 32 (H) 7 - 30 mg/dL    Creatinine 1.17 0.66 - 1.25 mg/dL    Calcium 8.4 (L) 8.5 - 10.1 mg/dL    Glucose 183 (H) 70 - 99 mg/dL    Alkaline Phosphatase 83 40 - 150 U/L    AST 23 0 - 45 U/L    ALT 33 0 - 70 U/L    Protein Total 7.0 6.8 - 8.8 g/dL    Albumin 3.3 (L) 3.4 - 5.0 g/dL    Bilirubin Total 1.2 0.2 - 1.3 mg/dL    GFR Estimate 61 >60 mL/min/1.73m2   Lipase   Result Value Ref Range    Lipase 43 (L) 73 - 393 U/L   Lactic acid whole blood   Result Value Ref Range    Lactic Acid 2.5 (H) 0.7 - 2.0 mmol/L   CBC with platelets and differential   Result Value Ref Range    WBC Count 7.7 4.0 - 11.0 10e3/uL    RBC Count 4.63 4.40 - 5.90 10e6/uL    Hemoglobin 14.6 13.3 - 17.7 g/dL    Hematocrit 41.8 40.0 - 53.0 %    MCV 90 78 - 100 fL    MCH 31.5 26.5 - 33.0 pg    MCHC 34.9 31.5 - 36.5 g/dL    RDW 13.1 10.0 - 15.0 %    Platelet Count 181 150 - 450 10e3/uL   Manual Differential   Result Value Ref Range    % Neutrophils 71 %    % Lymphocytes 12 %    % Monocytes 9 %    % Eosinophils 1 %    % Basophils 0 %    % Metamyelocytes 6 %    % Myelocytes 1 %    Absolute Neutrophils 5.5 1.6 - 8.3 10e3/uL    Absolute Lymphocytes 0.9 0.8 - 5.3 10e3/uL    Absolute Monocytes 0.7 0.0 - 1.3 10e3/uL    Absolute Eosinophils 0.1 0.0 - 0.7 10e3/uL    Absolute Basophils 0.0 0.0 - 0.2 10e3/uL    Absolute Metamyelocytes 0.5 (H) <=0.0 10e3/uL    Absolute Myelocytes 0.1 (H) <=0.0 10e3/uL    RBC Morphology Confirmed RBC Indices     Platelet Assessment  Automated Count Confirmed. Platelet morphology is normal.     Automated Count Confirmed. Platelet morphology is normal.   CT Abdomen Pelvis w Contrast    Narrative    EXAM: CT ABDOMEN AND PELVIS WITH CONTRAST  LOCATION: [facility]  DATE/TIME: 8/4/2021 2:37 AM    INDICATION: Abdominal distension.    COMPARISON:  05/11/2013.    TECHNIQUE: CT scan of the abdomen and pelvis was performed following injection of IV contrast. Multiplanar reformats were obtained. Dose reduction techniques were used.    CONTRAST: 100 mL Isovue 370.    FINDINGS:  LOWER CHEST: A trace amount of bilateral pleural fluid.    HEPATOBILIARY: A tiny low-attenuation focus in the right lobe of the liver, too small to characterize.    SPLEEN: Unremarkable.    PANCREAS: Unremarkable.    ADRENAL GLANDS: Unremarkable.    KIDNEYS/BLADDER: There are a few bilateral renal cysts, including a cyst containing a few internal septations in the upper pole of the right kidney. These were also present on the comparison study dated 05/11/2013 and are therefore of unlikely clinical   significance.    BOWEL: Small hiatal hernia. A long segment of moderately dilated fluid-filled small bowel is present in the abdomen and upper pelvis. There is an abrupt transition to decompressed more distal small bowel in the anterior aspect of the right hemipelvis.   The colon is nondistended. A few colonic diverticula are present without evidence of diverticulitis. The appendix is likely visualized and unremarkable. No bowel wall thickening, pneumatosis or free intraperitoneal gas.    LYMPH NODES: Unremarkable.    PELVIC ORGANS: Moderate enlargement of the prostate gland.    MUSCULOSKELETAL: Fusion hardware in the lumbar spine.    OTHER: Atherosclerotic calcification in the abdominal aorta.      Impression    IMPRESSION:   1. Mid small bowel obstruction: There is a long segment of moderately dilated fluid-filled small bowel in the abdomen and upper pelvis and the more distal bowel is relatively decompressed.  2. Colonic diverticulosis without evidence of diverticulitis.        Medications   ondansetron (ZOFRAN) injection 4 mg (4 mg Intravenous Given 8/4/21 0025)   HYDROmorphone (PF) (DILAUDID) injection 0.3-0.5 mg (0.5 mg Intravenous Given 8/4/21 0133)   iopamidol (ISOVUE-370) solution 100  mL (100 mLs Intravenous Given 8/4/21 0238)   sodium chloride 0.9 % bag 500mL for CT scan flush use (69 mLs Intravenous Given 8/4/21 0238)     3:26 AM Patient re-assessed: Reviewed CT. Positive for small bowel obstruction. Labs reassuringly relatively normal other than mild elevated lactic acid likely from the obstruction. Formal CT read pending.    3:39 AM: Discussed with Dr Mayers, surgery. He states he will consult on patient this AM.     3:40 AM: Discussed with Dr Mcnulty.  Accepts for admission.    Assessments & Plan (with Medical Decision Making)  31-year-old male with history of previous abdominal surgeries presenting for evaluation abdominal bloating, pain, nausea, and vomiting tonight.  Patient with a distended and tympanic abdomen with decreased bowel sounds suggestive of small bowel obstruction.  Afebrile in no distress in the ED but with some nausea.  Medications provided for symptom relief with good improvement.  Labs obtained showed a mild elevated lactic acid but no white count or left shift.  CT confirmed the presence of small bowel obstruction.  Given the absence of further nausea or vomiting.  Discussed risk and benefits of NG tube and patient and spouse are okay with delaying this given his resolution of symptoms.  Admitted to hospitalist service for medical management with a plan for surgical consultation.     I have reviewed the nursing notes.    I have reviewed the findings, diagnosis, plan and need for follow up with the patient.       New Prescriptions    No medications on file       Final diagnoses:   Small bowel obstruction (H)       8/3/2021   [facility] EMERGENCY DEPT     Delgado, Richard Mcghee MD  08/04/21 0344

## 2021-08-04 NOTE — PROGRESS NOTES
Admitted early this AM with SBO. Dr Layne consulted by ED. Pt has acid reflux now-not a chronic problem-started with the bowel obstruction  Will have tums/ maalox available.

## 2021-08-04 NOTE — CONSULTS
PCP:  Renee Downing    Chief complaint: Small bowel obstruction    History of Present Illness:Stuart is a 71-year-old man who presented to the emergency room yesterday with a relatively abrupt onset of abdominal pain.  He had pressure and bloating which was initially was relieved with a bowel movement.  The symptoms persisted and he presented to the hospital.  He has a previous history of a bowel obstruction but no surgery was performed.  The details of his surgical history are still somewhat vague.  He has had a total of 3 abdominal operations.  The first was an anterior approach for spinal surgery.  The second was a hernia repair and the third was a repair of a recurrent hernia.  Those records are not available to me currently.  He apparently has mesh from xiphoid to pubis.  This was placed in an open technique, not laparoscopic.    His work-up suggested a small bowel obstruction noted on CT scan.  A transition point was noted in the anterior aspect of the right hemipelvis.  No concerning features such as fluid pneumatosis or free air.    He has been intermittently vomiting.  He denies passing gas today although states that he had a bowel movement.  This was not documented, and his wife is not around at the time.    His lab work was unremarkable.  White count was 7.7 hemoglobin 14.6.  He is creatinine is 1.2 and glucose was 183.  He is a type II diabetic.  Covid testing was negative.        Histories:  No past medical history on file.    Past Surgical History:   Procedure Laterality Date     ROTATOR CUFF REPAIR RT/LT  2005    right shoulder     SURGICAL HISTORY OF -   1973    Parotid tumor--benign       Family History   Problem Relation Age of Onset     Alzheimer Disease Father      Prostate Cancer Father         at age 74     Cardiovascular Paternal Grandfather      Hypertension Brother      Lipids Brother      Lipids Son      Lipids Daughter        Social History     Tobacco Use     Smoking status: Former  Smoker     Quit date: 1984     Years since quittin.6     Smokeless tobacco: Never Used   Substance Use Topics     Alcohol use: No       No current outpatient medications on file.       Allergies   Allergen Reactions     Celebrex [Celecoxib] Anaphylaxis and Difficulty breathing     Amoxicillin Itching and Swelling     swelling in lips     Fiorinal Other (See Comments)     Burning red spots on B/L palms     Sulfa Drugs Other (See Comments)     Passes out       Images:  Recent Results (from the past 744 hour(s))   CT Abdomen Pelvis w Contrast    Narrative    EXAM: CT ABDOMEN AND PELVIS WITH CONTRAST  LOCATION: [facility]  DATE/TIME: 2021 2:37 AM    INDICATION: Abdominal distension.    COMPARISON: 2013.    TECHNIQUE: CT scan of the abdomen and pelvis was performed following injection of IV contrast. Multiplanar reformats were obtained. Dose reduction techniques were used.    CONTRAST: 100 mL Isovue 370.    FINDINGS:  LOWER CHEST: A trace amount of bilateral pleural fluid.    HEPATOBILIARY: A tiny low-attenuation focus in the right lobe of the liver, too small to characterize.    SPLEEN: Unremarkable.    PANCREAS: Unremarkable.    ADRENAL GLANDS: Unremarkable.    KIDNEYS/BLADDER: There are a few bilateral renal cysts, including a cyst containing a few internal septations in the upper pole of the right kidney. These were also present on the comparison study dated 2013 and are therefore of unlikely clinical   significance.    BOWEL: Small hiatal hernia. A long segment of moderately dilated fluid-filled small bowel is present in the abdomen and upper pelvis. There is an abrupt transition to decompressed more distal small bowel in the anterior aspect of the right hemipelvis.   The colon is nondistended. A few colonic diverticula are present without evidence of diverticulitis. The appendix is likely visualized and unremarkable. No bowel wall thickening, pneumatosis or free  intraperitoneal gas.    LYMPH NODES: Unremarkable.    PELVIC ORGANS: Moderate enlargement of the prostate gland.    MUSCULOSKELETAL: Fusion hardware in the lumbar spine.    OTHER: Atherosclerotic calcification in the abdominal aorta.      Impression    IMPRESSION:   1. Mid small bowel obstruction: There is a long segment of moderately dilated fluid-filled small bowel in the abdomen and upper pelvis and the more distal bowel is relatively decompressed.  2. Colonic diverticulosis without evidence of diverticulitis.        Labs:  Results for orders placed or performed during the hospital encounter of 08/03/21   CT Abdomen Pelvis w Contrast     Status: None    Narrative    EXAM: CT ABDOMEN AND PELVIS WITH CONTRAST  LOCATION: [facility]  DATE/TIME: 8/4/2021 2:37 AM    INDICATION: Abdominal distension.    COMPARISON: 05/11/2013.    TECHNIQUE: CT scan of the abdomen and pelvis was performed following injection of IV contrast. Multiplanar reformats were obtained. Dose reduction techniques were used.    CONTRAST: 100 mL Isovue 370.    FINDINGS:  LOWER CHEST: A trace amount of bilateral pleural fluid.    HEPATOBILIARY: A tiny low-attenuation focus in the right lobe of the liver, too small to characterize.    SPLEEN: Unremarkable.    PANCREAS: Unremarkable.    ADRENAL GLANDS: Unremarkable.    KIDNEYS/BLADDER: There are a few bilateral renal cysts, including a cyst containing a few internal septations in the upper pole of the right kidney. These were also present on the comparison study dated 05/11/2013 and are therefore of unlikely clinical   significance.    BOWEL: Small hiatal hernia. A long segment of moderately dilated fluid-filled small bowel is present in the abdomen and upper pelvis. There is an abrupt transition to decompressed more distal small bowel in the anterior aspect of the right hemipelvis.   The colon is nondistended. A few colonic diverticula are present without evidence of  diverticulitis. The appendix is likely visualized and unremarkable. No bowel wall thickening, pneumatosis or free intraperitoneal gas.    LYMPH NODES: Unremarkable.    PELVIC ORGANS: Moderate enlargement of the prostate gland.    MUSCULOSKELETAL: Fusion hardware in the lumbar spine.    OTHER: Atherosclerotic calcification in the abdominal aorta.      Impression    IMPRESSION:   1. Mid small bowel obstruction: There is a long segment of moderately dilated fluid-filled small bowel in the abdomen and upper pelvis and the more distal bowel is relatively decompressed.  2. Colonic diverticulosis without evidence of diverticulitis.    Comprehensive metabolic panel     Status: Abnormal   Result Value Ref Range    Sodium 135 133 - 144 mmol/L    Potassium 3.6 3.4 - 5.3 mmol/L    Chloride 102 94 - 109 mmol/L    Carbon Dioxide (CO2) 24 20 - 32 mmol/L    Anion Gap 9 3 - 14 mmol/L    Urea Nitrogen 32 (H) 7 - 30 mg/dL    Creatinine 1.17 0.66 - 1.25 mg/dL    Calcium 8.4 (L) 8.5 - 10.1 mg/dL    Glucose 183 (H) 70 - 99 mg/dL    Alkaline Phosphatase 83 40 - 150 U/L    AST 23 0 - 45 U/L    ALT 33 0 - 70 U/L    Protein Total 7.0 6.8 - 8.8 g/dL    Albumin 3.3 (L) 3.4 - 5.0 g/dL    Bilirubin Total 1.2 0.2 - 1.3 mg/dL    GFR Estimate 61 >60 mL/min/1.73m2   Lipase     Status: Abnormal   Result Value Ref Range    Lipase 43 (L) 73 - 393 U/L   Lactic acid whole blood     Status: Abnormal   Result Value Ref Range    Lactic Acid 2.5 (H) 0.7 - 2.0 mmol/L   CBC with platelets and differential     Status: Normal   Result Value Ref Range    WBC Count 7.7 4.0 - 11.0 10e3/uL    RBC Count 4.63 4.40 - 5.90 10e6/uL    Hemoglobin 14.6 13.3 - 17.7 g/dL    Hematocrit 41.8 40.0 - 53.0 %    MCV 90 78 - 100 fL    MCH 31.5 26.5 - 33.0 pg    MCHC 34.9 31.5 - 36.5 g/dL    RDW 13.1 10.0 - 15.0 %    Platelet Count 181 150 - 450 10e3/uL   Manual Differential     Status: Abnormal   Result Value Ref Range    % Neutrophils 71 %    % Lymphocytes 12 %    % Monocytes 9 %     % Eosinophils 1 %    % Basophils 0 %    % Metamyelocytes 6 %    % Myelocytes 1 %    Absolute Neutrophils 5.5 1.6 - 8.3 10e3/uL    Absolute Lymphocytes 0.9 0.8 - 5.3 10e3/uL    Absolute Monocytes 0.7 0.0 - 1.3 10e3/uL    Absolute Eosinophils 0.1 0.0 - 0.7 10e3/uL    Absolute Basophils 0.0 0.0 - 0.2 10e3/uL    Absolute Metamyelocytes 0.5 (H) <=0.0 10e3/uL    Absolute Myelocytes 0.1 (H) <=0.0 10e3/uL    RBC Morphology Confirmed RBC Indices     Platelet Assessment  Automated Count Confirmed. Platelet morphology is normal.     Automated Count Confirmed. Platelet morphology is normal.   SARS-COV2 (COVID-19) Virus RT-PCR     Status: Normal    Specimen: Nasopharyngeal; Swab   Result Value Ref Range    SARS CoV2 PCR Negative Negative    Narrative    Testing was performed using the michel  SARS-CoV-2 & Influenza A/B Assay on the michel  [employer]  System.  This test should be ordered for the detection of SARS-COV-2 in individuals who meet SARS-CoV-2 clinical and/or epidemiological criteria. Test performance is unknown in asymptomatic patients.  This test is for in vitro diagnostic use under the FDA EUA for laboratories certified under CLIA to perform moderate and/or high complexity testing. This test has not been FDA cleared or approved.  A negative test does not rule out the presence of PCR inhibitors in the specimen or target RNA in concentration below the limit of detection for the assay. The possibility of a false negative should be considered if the patient's recent exposure or clinical presentation suggests COVID-19.  [facility] are certified under the Clinical Laboratory Improvement Amendments of 1988 (CLIA-88) as qualified to perform moderate and/or high complexity laboratory testing.   Asymptomatic COVID-19 Virus (Coronavirus) by PCR Nasopharyngeal     Status: Normal    Specimen: Nasopharyngeal; Swab    Narrative    The following orders were created for panel order Asymptomatic COVID-19 Virus  "(Coronavirus) by PCR Nasopharyngeal.  Procedure                               Abnormality         Status                     ---------                               -----------         ------                     SARS-COV2 (COVID-19) Vir...[[id number]]  Normal              Final result                 Please view results for these tests on the individual orders.   CBC with platelets differential     Status: Abnormal    Narrative    The following orders were created for panel order CBC with platelets differential.  Procedure                               Abnormality         Status                     ---------                               -----------         ------                     CBC with platelets and d...[[id number]]  Normal              Final result               Manual Differential[[id number]]          Abnormal            Final result                 Please view results for these tests on the individual orders.       ROS:  Constitutional - Denies fevers, weight loss, malaise, lethargy  Neuro - Denies tremors or seizures  Pulmon - Denies SOB, dyspnea, hemoptysis, chronic cough  CV - Denies CP, SOB, lower extremity edema,  GI - Denies hematemesis, BRBPR, melena, chronic diarrhea   - Denies hematuria, difficulty voiding, h/o STDs  Rheumatology - No h/o RA      /63 (BP Location: Right arm)   Pulse 99   Temp 98.5  F (36.9  C) (Oral)   Resp 16   Ht 1.778 m (5' 10\")   Wt 117.6 kg (259 lb 4.2 oz)   SpO2 93%   BMI 37.20 kg/m      Exam:  General - Alert and Oriented X4, NAD, well nourished  HEENT - Normocephalic, atraumatic  Lungs - Clear to auscultation bilaterally  Abdomen - Soft, nontender, not visibly distended, bowel sounds are hypoactive.  No hernias are palpable, no masses  Neuro -  intact  Extremities - No cyanosis, clubbing or edema.      Assessment and Plan: Patient presents with a small bowel obstruction.  This fairly straightforward with the exception of the mesh and the multiple abdominal " procedures.  Surgical treatment could be very difficult, and hopefully that will be necessary.  He currently does not have a nasogastric tube but I will be placing 1 to decompress him and also to start a Gastrografin study.  At this point there is no indication for urgent surgery.  We will await the results of the Gastrografin challenge and proceed accordingly.  Patient and his wife understand.  She is a retired nurse, and is very aware of the issues involved.    Attila Mayers MD FACS

## 2021-08-04 NOTE — ED NOTES
[facility]   ED Nurse to Floor Handoff     Stuart Ortega is a 71 year old male who speaks English and lives with a spouse,  in a home  They arrived in the ED by car from emergency room    ED Chief Complaint: Abdominal Pain (Has abd distention.  Last BM 45 min PTA- loose.  Vomiting in triage. )    ED Dx;   Final diagnoses:   Small bowel obstruction (H)         Needed?: No    Allergies:   Allergies   Allergen Reactions     Celebrex [Celecoxib] Anaphylaxis and Difficulty breathing     Amoxicillin Itching and Swelling     swelling in lips     Fiorinal Other (See Comments)     Burning red spots on B/L palms     Sulfa Drugs Other (See Comments)     Passes out   .  Past Medical Hx: No past medical history on file.   Baseline Mental status: WDL  Current Mental Status changes: at basesline    Infection present or suspected this encounter: no  Sepsis suspected: No  Isolation type: No active isolations  Patient tested for COVID 19 prior to admission: YES     Activity level - Baseline/Home:  independent  Activity Level - Current:   Independent    Bariatric equipment needed?: No    In the ED these meds were given:   Medications   HYDROmorphone (PF) (DILAUDID) injection 0.3-0.5 mg (0.5 mg Intravenous Given 8/4/21 0133)   dextrose 5% in lactated ringers infusion ( Intravenous New Bag 8/4/21 0600)   ondansetron (ZOFRAN) injection 4 mg (4 mg Intravenous Given 8/4/21 0753)   iopamidol (ISOVUE-370) solution 100 mL (100 mLs Intravenous Given 8/4/21 0238)   sodium chloride 0.9 % bag 500mL for CT scan flush use (69 mLs Intravenous Given 8/4/21 0238)       Drips running?  No    Home pump  No    Current LDAs  Peripheral IV 08/04/21 Left Upper arm (Active)   Site Assessment WDL 08/04/21 0022   Number of days: 0       Labs results:   Labs Ordered and Resulted from Time of ED Arrival Up to the Time of Departure from the ED   COMPREHENSIVE METABOLIC PANEL - Abnormal; Notable for the following  components:       Result Value    Urea Nitrogen 32 (*)     Calcium 8.4 (*)     Glucose 183 (*)     Albumin 3.3 (*)     All other components within normal limits   LIPASE - Abnormal; Notable for the following components:    Lipase 43 (*)     All other components within normal limits   LACTIC ACID WHOLE BLOOD - Abnormal; Notable for the following components:    Lactic Acid 2.5 (*)     All other components within normal limits   DIFFERENTIAL - Abnormal; Notable for the following components:    Absolute Metamyelocytes 0.5 (*)     Absolute Myelocytes 0.1 (*)     All other components within normal limits   CBC WITH PLATELETS AND DIFFERENTIAL - Normal   SARS-COV2 (COVID-19) VIRUS RT-PCR - Normal    Narrative:     Testing was performed using the michel  SARS-CoV-2 & Influenza A/B Assay on the michel  Celine  System.  This test should be ordered for the detection of SARS-COV-2 in individuals who meet SARS-CoV-2 clinical and/or epidemiological criteria. Test performance is unknown in asymptomatic patients.  This test is for in vitro diagnostic use under the FDA EUA for laboratories certified under CLIA to perform moderate and/or high complexity testing. This test has not been FDA cleared or approved.  A negative test does not rule out the presence of PCR inhibitors in the specimen or target RNA in concentration below the limit of detection for the assay. The possibility of a false negative should be considered if the patient's recent exposure or clinical presentation suggests COVID-19.  [facility] are certified under the Clinical Laboratory Improvement Amendments of 1988 (CLIA-88) as qualified to perform moderate and/or high complexity laboratory testing.   PERIPHERAL IV CATHETER   COVID-19 VIRUS (CORONAVIRUS) BY PCR    Narrative:     The following orders were created for panel order Asymptomatic COVID-19 Virus (Coronavirus) by PCR Nasopharyngeal.  Procedure                               Abnormality          Status                     ---------                               -----------         ------                     SARS-COV2 (COVID-19) Vir...[[id number]]  Normal              Final result                 Please view results for these tests on the individual orders.   CBC WITH PLATELETS & DIFFERENTIAL    Narrative:     The following orders were created for panel order CBC with platelets differential.  Procedure                               Abnormality         Status                     ---------                               -----------         ------                     CBC with platelets and d...[[id number]]  Normal              Final result               Manual Differential[[id number]]          Abnormal            Final result                 Please view results for these tests on the individual orders.       Imaging Studies:   Recent Results (from the past 24 hour(s))   CT Abdomen Pelvis w Contrast    Narrative    EXAM: CT ABDOMEN AND PELVIS WITH CONTRAST  LOCATION: [facility]  DATE/TIME: 8/4/2021 2:37 AM    INDICATION: Abdominal distension.    COMPARISON: 05/11/2013.    TECHNIQUE: CT scan of the abdomen and pelvis was performed following injection of IV contrast. Multiplanar reformats were obtained. Dose reduction techniques were used.    CONTRAST: 100 mL Isovue 370.    FINDINGS:  LOWER CHEST: A trace amount of bilateral pleural fluid.    HEPATOBILIARY: A tiny low-attenuation focus in the right lobe of the liver, too small to characterize.    SPLEEN: Unremarkable.    PANCREAS: Unremarkable.    ADRENAL GLANDS: Unremarkable.    KIDNEYS/BLADDER: There are a few bilateral renal cysts, including a cyst containing a few internal septations in the upper pole of the right kidney. These were also present on the comparison study dated 05/11/2013 and are therefore of unlikely clinical   significance.    BOWEL: Small hiatal hernia. A long segment of moderately dilated fluid-filled small bowel is  present in the abdomen and upper pelvis. There is an abrupt transition to decompressed more distal small bowel in the anterior aspect of the right hemipelvis.   The colon is nondistended. A few colonic diverticula are present without evidence of diverticulitis. The appendix is likely visualized and unremarkable. No bowel wall thickening, pneumatosis or free intraperitoneal gas.    LYMPH NODES: Unremarkable.    PELVIC ORGANS: Moderate enlargement of the prostate gland.    MUSCULOSKELETAL: Fusion hardware in the lumbar spine.    OTHER: Atherosclerotic calcification in the abdominal aorta.      Impression    IMPRESSION:   1. Mid small bowel obstruction: There is a long segment of moderately dilated fluid-filled small bowel in the abdomen and upper pelvis and the more distal bowel is relatively decompressed.  2. Colonic diverticulosis without evidence of diverticulitis.        Recent vital signs:   BP (!) 126/95   Pulse 86   Temp 98.4  F (36.9  C) (Tympanic)   Resp 16   Wt 108.9 kg (240 lb)   SpO2 95%   BMI 33.47 kg/m      Ezra Coma Scale Score: 15 (08/03/21 2324)       Cardiac Rhythm: regular  Pt needs tele? No  Skin/wound Issues: None    Code Status: Full Code    Pain control: good    Nausea control: good    Abnormal labs/tests/findings requiring intervention: none    Family present during ED course? Yes   Family Comments/Social Situation comments: n/a    Tasks needing completion: None    Kelly Mccauley RN

## 2021-08-04 NOTE — PLAN OF CARE
"[facility] [facility] ADMISSION NOTE    Patient admitted to room 2313 at approximately 0820 via wheel chair from emergency room. Patient was accompanied by transport tech.     Verbal SBAR report received from JUSTINE Avery prior to patient arrival.     Patient ambulated to bed with stand-by assist. Patient alert and oriented X 3. Pain is controlled with current analgesics.  Medication(s) being used: narcotic analgesics including hydromorphone (Dilaudid).  . Admission vital signs: Blood pressure 133/71, pulse 97, temperature (!) 100.5  F (38.1  C), resp. rate 16, height 1.778 m (5' 10\"), weight 117.6 kg (259 lb 4.2 oz), SpO2 93 %. Patient was oriented to plan of care, call light, bed controls, tv, telephone, bathroom, and visiting hours.     Risk Assessment    The following safety risks were identified during admission: none. Yellow risk band applied: NO.     Skin Initial Assessment    This writer admitted this patient and completed a full skin assessment and Sawyer score in the Adult PCS flowsheet. Appropriate interventions initiated as needed.     Secondary skin check declined by patient.    Sawyer Risk Assessment  Sensory Perception: 4-->no impairment  Moisture: 4-->rarely moist  Activity: 3-->walks occasionally  Mobility: 4-->no limitation  Nutrition: 3-->adequate  Friction and Shear: 3-->no apparent problem  Sawyer Score: 21  Mattress: Standard Hospital Mattress (Foam)  Bed Frame: Standard width and length    Education    Patient has a Morton to Observation order: No  Observation education completed and documented: N/A      Stella Nam RN      "

## 2021-08-04 NOTE — H&P
Hospitalist History and Physical     Name: Stuart Ortega  MRN: [id number]  [facility]: [id number]  Admission Date/Time: 8/3/2021 11:26 PM  Primary Care Provider / Referring Physician:  Renee Downing     Principal Problem:   Small bowel obstruction (H)    Assessment/Plan:   Stuart Ortega is a 71 year old male who presents on 8/3/2021 with abdominal pain, nausea and vomiting since yesterday.    Small bowel obstruction. CT scan shows mid small bowel obstruction: There is a long segment of moderately dilated fluid-filled small bowel in the abdomen and upper pelvis and the more distal bowel is relatively decompressed. Colonic diverticulosis without evidence of diverticulitis. Laboratory shows WBC 7.7, lactic acid 2.5, lipase 43, LFT normal.  - NPO,  - IV fluids,   - NG tube to low intermittent suction if persistent vomiting,   - surgical consult for non resolution,   - monitor and correct electrolytes,  - Pain medications and antiemetics when necessary  - GI prophylaxis  - we will hold his aspirin.  The patient is not on aspirin regularly    Mildly elevated lactic acid 2.5.  No signs of infection.  Most likely dehydration/ischemic bowel/SBO.  No signs of infection  - we will repeat the lactic acid in the morning  - Start the patient on fluids    Mildly elevated lipase 43, most likely reactive due to vomiting and small bowel obstruction.  No signs of pancreatitis on CT scan  -we will repeat the lactic acid in the morning  -We will continue with fluids    Hypertension, hyperlipidemia, GERD, depression, migraine headache  -we will hold his home medications for now.  The patient is nothing by mouth      Diet:  Orders Placed This Encounter      NPO for Medical/Clinical Reasons Except for: No Exceptions      DVT Prophylaxis: Low Risk/Ambulatory with no VTE prophylaxis indicated  Code Status: No Order  [place] Catheter: not present   Disposition Plan: Expected discharge: Tomorrow, recommended to prior living arrangement once  adequate pain management/ tolerating PO medications.  Entered:  Chilo Mcnulty MD 8/4/2021, 6:14 AM    The patient's care was discussed with the Bedside Nurse and Patient.  Chilo Mcnulty MD  Tele-Hospitalist Service  [facility]  Contact information available via [facility] Paging/Directory  Chief Complaint:     Chief Complaint   Patient presents with     Abdominal Pain     Has abd distention.  Last BM 45 min PTA- loose.  Vomiting in triage.        History of Presenting Illness:   Stuart Ortega is a 71 year old male with past medical history of hypertension, hyperlipidemia, GERD, low back pain, depression, BPH, migraine headaches now presents on 8/3/2021 with abdominal pain, bloating and vomiting started yesterday relatively abruptly.  Abdominal pain was described as pressure, lower quadrants, moderate, waning and waxing, relieved by bowel movement and pain medications, associated with vomiting.  The patient reports similar symptoms in 2013 when he heads small bowel obstruction.  He has a history of previous abdominal surgery for ventral hernia after anterior and posterior spinal surgery.  Denies any fever, shortness of breath, cough, chest pain, palpitations, diarrhea or dysuria.  His last bowel movement was yesterday and was small amounts.  In the ER he was given Zofran 4 mg IV, Dilaudid 0.5 mg IV and 500 ml bolus of NS. Surgery was consulted from the ED.    Past Medical History:   No past medical history on file.      Past Surgical History:   Stuart  has a past surgical history that includes surgical history of -  (1973) and rotator cuff repair rt/lt (2005).     Social History:   Stuart  reports that he quit smoking about 37 years ago. He has never used smokeless tobacco. He reports that he does not drink alcohol and does not use drugs.    Family History:   Stuart's family history includes Alzheimer Disease in his father; Cardiovascular in his paternal grandfather; Hypertension in his  brother; Lipids in his brother, daughter, and son; Prostate Cancer in his father.    Allergies:   Allergies have been reviewed. Stuart is allergic to celebrex [celecoxib], amoxicillin, fiorinal, and sulfa drugs.    Prior to Admission Medications:   (Not in a hospital admission)      Review of Systems:   A 14 point comprehensive review of system was performed as per the history of presenting illness, otherwise essentially unremarkable.     Vitals:   Blood pressure 132/78, pulse 82, temperature 98.4  F (36.9  C), temperature source Tympanic, resp. rate 16, weight 108.9 kg (240 lb), SpO2 91 %.  Body mass index is 33.47 kg/m .  First recorded vital signs:  Temp: 98.4  F (36.9  C) - BP: 110/62 - Pulse: 85 - Resp: 16 - SpO2: 94 %      Most recent vital signs:  Temp: 98.4  F (36.9  C) - BP: 132/78 - Pulse: 82 - Resp: 16 - SpO2: 91 %   - Body mass index is 33.47 kg/m .     Physical Exam:    General: the patient is a morbidly obese male in no apparent distress.  Head: atraumatic  Eyes: extraocular movements intact, pupils are reactive to light bilateral from 4 mm to 2 mm  ENT: nares patent, moist mucous membranes, no oral or pharyngeal lesions  Neck: no thyroid goiter or mass, no carotid bruits, neck has normal flexion and extension  Respiratory: patient is breathing comfortably, breath sounds are equal bilateral, no wheezes, no crackles on auscultation exam. No chest wall tenderness  Cardiovascular: regular rate and rhythm, no murmurs, rubs, or gallops heard,   Abdomen: audible bowel sounds present, there is mild tenderness to palpation lower quadrants, liver and spleen not palpated.  Midline surgical scar noted.  Skin: clear without significant rashes or lesions  Neuro: grossly intact, alert and oriented x3  MSK: No pitting edema or joint swelling    Labs:    CMP:  Recent Labs   Lab Test 08/04/21  0028      CO2 24   BUN 32*   ALBUMIN 3.3*   ALKPHOS 83   AST 23   ALT 33   ANIONGAP 9   LIPASE 43*     CBC:  Recent Labs    Lab Test 08/04/21  0028   WBC 7.7   HGB 14.6        Coags::  No results for input(s): INR in the last 83866 hours.  Troponin:    No lab results found.  INR:    No lab results found.  D-DIMER:  No results found for: DIMER  BNP:  Lab Results   Component Value Date    BNP 9 01/02/2006     UA:  No results for input(s): COLOR, APPEARANCE, URINEGLC, URINEBILI, URINEKETONE, SG, UBLD, URINEPH, PROTEIN, UROBILINOGEN, NITRITE, LEUKEST, RBCU, WBCU in the last 168 hours.  Lactic Acid:    Lab Results   Component Value Date    LACT 2.5 08/04/2021         ABG:  -No lab results found in last 7 days.  Imaging:   CT Abdomen Pelvis w Contrast    Result Date: 8/4/2021  EXAM: CT ABDOMEN AND PELVIS WITH CONTRAST LOCATION: [facility] DATE/TIME: 8/4/2021 2:37 AM INDICATION: Abdominal distension. COMPARISON: 05/11/2013. TECHNIQUE: CT scan of the abdomen and pelvis was performed following injection of IV contrast. Multiplanar reformats were obtained. Dose reduction techniques were used. CONTRAST: 100 mL Isovue 370. FINDINGS: LOWER CHEST: A trace amount of bilateral pleural fluid. HEPATOBILIARY: A tiny low-attenuation focus in the right lobe of the liver, too small to characterize. SPLEEN: Unremarkable. PANCREAS: Unremarkable. ADRENAL GLANDS: Unremarkable. KIDNEYS/BLADDER: There are a few bilateral renal cysts, including a cyst containing a few internal septations in the upper pole of the right kidney. These were also present on the comparison study dated 05/11/2013 and are therefore of unlikely clinical significance. BOWEL: Small hiatal hernia. A long segment of moderately dilated fluid-filled small bowel is present in the abdomen and upper pelvis. There is an abrupt transition to decompressed more distal small bowel in the anterior aspect of the right hemipelvis. The colon is nondistended. A few colonic diverticula are present without evidence of diverticulitis. The appendix is likely visualized and  unremarkable. No bowel wall thickening, pneumatosis or free intraperitoneal gas. LYMPH NODES: Unremarkable. PELVIC ORGANS: Moderate enlargement of the prostate gland. MUSCULOSKELETAL: Fusion hardware in the lumbar spine. OTHER: Atherosclerotic calcification in the abdominal aorta.     IMPRESSION: 1. Mid small bowel obstruction: There is a long segment of moderately dilated fluid-filled small bowel in the abdomen and upper pelvis and the more distal bowel is relatively decompressed. 2. Colonic diverticulosis without evidence of diverticulitis.       Visit/Communication Style   Virtual (Video) communication was used to evaluate Stuart.  Stuart consented to the use of video communication: yes  Video START time: 0600, 8/4/2021  Video STOP time: 0615, 8/4/2021   Patient's location: [facility]   Provider's location during the visit: [facility] Tele-medicine site        Gavin Mcnulty DO  8/4/2021  6:14 AM    I performed this consultation using real-time telehealth tools, including a live video connection between my location and the patient's location.    As the provider for this telehealth service, I attest that I introduced myself to the patient, provided my credentials, disclosed my location, and determined that, based on a review of the patients chart and/or a discussion with members of the patient's treatment team, telemedicine via a real-time, two-way, interactive audio and video platform is an appropriate and effective means of providing this service. The patient and I mutually agree that this visit is appropriate for telemedicine as well.    Disclaimer Note: To increase efficiency, your provider may have prepared this document using voice recognition technology. In that case, if a word or phrase is confusing, or does not make sense, this is likely due to a recognition error within the program which was not discovered during the provider s review. If you believe an error has  occurred, please notify your provider s office at your earliest convenience, so we can correct any mistakes.

## 2021-08-04 NOTE — PROGRESS NOTES
"A&O. Up with SBA to bathroom. IV infusing continuous D5% LR @ 100 ml/hr. NG placed at 1810 in left nostril. X-ray for placement obtained. As of 1900, patient is set to low-intermittent suction. Patient will undergo a Gastrografin Challenge after being on suction for at least two hours. NPO except meds and ice chips. Intermittent nausea present, emesis x 1 immediately after NG placement. Wife was here to visit today. BP (!) 140/82 (BP Location: Right arm)   Pulse 108   Temp 98.3  F (36.8  C) (Oral)   Resp 18   Ht 1.778 m (5' 10\")   Wt 117.6 kg (259 lb 4.2 oz)   SpO2 91%   BMI 37.20 kg/m  .       Stella Nam RN on 8/4/2021 at 7:02 PM      "

## 2021-08-05 ENCOUNTER — APPOINTMENT (OUTPATIENT)
Dept: GENERAL RADIOLOGY | Facility: CLINIC | Age: 71
DRG: 390 | End: 2021-08-05
Attending: SURGERY
Payer: MEDICARE

## 2021-08-05 LAB
ALBUMIN SERPL-MCNC: 2.8 G/DL (ref 3.4–5)
ALP SERPL-CCNC: 66 U/L (ref 40–150)
ALT SERPL W P-5'-P-CCNC: 22 U/L (ref 0–70)
ANION GAP SERPL CALCULATED.3IONS-SCNC: 6 MMOL/L (ref 3–14)
ANION GAP SERPL CALCULATED.3IONS-SCNC: 7 MMOL/L (ref 3–14)
AST SERPL W P-5'-P-CCNC: 13 U/L (ref 0–45)
BILIRUB SERPL-MCNC: 0.7 MG/DL (ref 0.2–1.3)
BUN SERPL-MCNC: 22 MG/DL (ref 7–30)
BUN SERPL-MCNC: 24 MG/DL (ref 7–30)
CALCIUM SERPL-MCNC: 8.7 MG/DL (ref 8.5–10.1)
CALCIUM SERPL-MCNC: 9.3 MG/DL (ref 8.5–10.1)
CHLORIDE BLD-SCNC: 103 MMOL/L (ref 94–109)
CHLORIDE BLD-SCNC: 99 MMOL/L (ref 94–109)
CO2 SERPL-SCNC: 30 MMOL/L (ref 20–32)
CO2 SERPL-SCNC: 30 MMOL/L (ref 20–32)
CREAT SERPL-MCNC: 0.89 MG/DL (ref 0.66–1.25)
CREAT SERPL-MCNC: 0.95 MG/DL (ref 0.66–1.25)
GFR SERPL CREATININE-BSD FRML MDRD: 79 ML/MIN/1.73M2
GFR SERPL CREATININE-BSD FRML MDRD: 85 ML/MIN/1.73M2
GLUCOSE BLD-MCNC: 156 MG/DL (ref 70–99)
GLUCOSE BLD-MCNC: 175 MG/DL (ref 70–99)
HOLD SPECIMEN: NORMAL
LACTATE SERPL-SCNC: 1.2 MMOL/L (ref 0.7–2)
LIPASE SERPL-CCNC: 32 U/L (ref 73–393)
POTASSIUM BLD-SCNC: 3.3 MMOL/L (ref 3.4–5.3)
POTASSIUM BLD-SCNC: 3.4 MMOL/L (ref 3.4–5.3)
POTASSIUM BLD-SCNC: 3.7 MMOL/L (ref 3.4–5.3)
POTASSIUM BLD-SCNC: 3.8 MMOL/L (ref 3.4–5.3)
PROT SERPL-MCNC: 6.5 G/DL (ref 6.8–8.8)
SODIUM SERPL-SCNC: 136 MMOL/L (ref 133–144)
SODIUM SERPL-SCNC: 139 MMOL/L (ref 133–144)

## 2021-08-05 PROCEDURE — 250N000011 HC RX IP 250 OP 636: Performed by: HOSPITALIST

## 2021-08-05 PROCEDURE — 99232 SBSQ HOSP IP/OBS MODERATE 35: CPT | Performed by: INTERNAL MEDICINE

## 2021-08-05 PROCEDURE — 250N000011 HC RX IP 250 OP 636: Performed by: INTERNAL MEDICINE

## 2021-08-05 PROCEDURE — 120N000001 HC R&B MED SURG/OB

## 2021-08-05 PROCEDURE — 82310 ASSAY OF CALCIUM: CPT | Performed by: HOSPITALIST

## 2021-08-05 PROCEDURE — 83605 ASSAY OF LACTIC ACID: CPT | Performed by: INTERNAL MEDICINE

## 2021-08-05 PROCEDURE — 83690 ASSAY OF LIPASE: CPT | Performed by: INTERNAL MEDICINE

## 2021-08-05 PROCEDURE — 250N000013 HC RX MED GY IP 250 OP 250 PS 637: Performed by: INTERNAL MEDICINE

## 2021-08-05 PROCEDURE — 74250 X-RAY XM SM INT 1CNTRST STD: CPT

## 2021-08-05 PROCEDURE — 84132 ASSAY OF SERUM POTASSIUM: CPT | Performed by: INTERNAL MEDICINE

## 2021-08-05 PROCEDURE — 80053 COMPREHEN METABOLIC PANEL: CPT | Performed by: INTERNAL MEDICINE

## 2021-08-05 PROCEDURE — 36415 COLL VENOUS BLD VENIPUNCTURE: CPT | Performed by: INTERNAL MEDICINE

## 2021-08-05 PROCEDURE — 250N000011 HC RX IP 250 OP 636: Performed by: EMERGENCY MEDICINE

## 2021-08-05 PROCEDURE — 99207 PR CDG-MDM COMPONENT: MEETS MODERATE - DOWN CODED: CPT | Performed by: INTERNAL MEDICINE

## 2021-08-05 RX ORDER — NALOXONE HYDROCHLORIDE 0.4 MG/ML
0.2 INJECTION, SOLUTION INTRAMUSCULAR; INTRAVENOUS; SUBCUTANEOUS
Status: DISCONTINUED | OUTPATIENT
Start: 2021-08-05 | End: 2021-08-09 | Stop reason: HOSPADM

## 2021-08-05 RX ORDER — NALOXONE HYDROCHLORIDE 0.4 MG/ML
0.4 INJECTION, SOLUTION INTRAMUSCULAR; INTRAVENOUS; SUBCUTANEOUS
Status: DISCONTINUED | OUTPATIENT
Start: 2021-08-05 | End: 2021-08-09 | Stop reason: HOSPADM

## 2021-08-05 RX ORDER — POTASSIUM CHLORIDE 1500 MG/1
40 TABLET, EXTENDED RELEASE ORAL ONCE
Status: COMPLETED | OUTPATIENT
Start: 2021-08-05 | End: 2021-08-05

## 2021-08-05 RX ORDER — FAMOTIDINE 20 MG/1
40 TABLET, FILM COATED ORAL DAILY
Status: DISCONTINUED | OUTPATIENT
Start: 2021-08-05 | End: 2021-08-05

## 2021-08-05 RX ORDER — VERAPAMIL HYDROCHLORIDE 240 MG/1
240 TABLET, FILM COATED, EXTENDED RELEASE ORAL EVERY EVENING
Status: DISCONTINUED | OUTPATIENT
Start: 2021-08-05 | End: 2021-08-09 | Stop reason: HOSPADM

## 2021-08-05 RX ORDER — DOXAZOSIN 2 MG/1
2 TABLET ORAL AT BEDTIME
Status: DISCONTINUED | OUTPATIENT
Start: 2021-08-05 | End: 2021-08-09 | Stop reason: HOSPADM

## 2021-08-05 RX ORDER — HYDROMORPHONE HCL IN WATER/PF 6 MG/30 ML
.2-.4 PATIENT CONTROLLED ANALGESIA SYRINGE INTRAVENOUS
Status: DISCONTINUED | OUTPATIENT
Start: 2021-08-05 | End: 2021-08-09 | Stop reason: HOSPADM

## 2021-08-05 RX ADMIN — FAMOTIDINE 20 MG: 20 INJECTION, SOLUTION INTRAVENOUS at 19:49

## 2021-08-05 RX ADMIN — POTASSIUM CHLORIDE 40 MEQ: 20 TABLET, EXTENDED RELEASE ORAL at 09:49

## 2021-08-05 RX ADMIN — HYDROMORPHONE HYDROCHLORIDE 0.5 MG: 1 INJECTION, SOLUTION INTRAMUSCULAR; INTRAVENOUS; SUBCUTANEOUS at 12:43

## 2021-08-05 RX ADMIN — SODIUM CHLORIDE, SODIUM LACTATE, POTASSIUM CHLORIDE, CALCIUM CHLORIDE AND DEXTROSE MONOHYDRATE: 5; 600; 310; 30; 20 INJECTION, SOLUTION INTRAVENOUS at 19:50

## 2021-08-05 RX ADMIN — ONDANSETRON 4 MG: 4 TABLET, ORALLY DISINTEGRATING ORAL at 12:43

## 2021-08-05 RX ADMIN — DIATRIZOATE MEGLUMINE AND DIATRIZOATE SODIUM 240 ML: 660; 100 SOLUTION ORAL; RECTAL at 08:20

## 2021-08-05 RX ADMIN — HYDROMORPHONE HYDROCHLORIDE 0.4 MG: 0.2 INJECTION, SOLUTION INTRAMUSCULAR; INTRAVENOUS; SUBCUTANEOUS at 20:33

## 2021-08-05 RX ADMIN — SODIUM CHLORIDE, SODIUM LACTATE, POTASSIUM CHLORIDE, CALCIUM CHLORIDE AND DEXTROSE MONOHYDRATE: 5; 600; 310; 30; 20 INJECTION, SOLUTION INTRAVENOUS at 03:54

## 2021-08-05 ASSESSMENT — ACTIVITIES OF DAILY LIVING (ADL)
ADLS_ACUITY_SCORE: 17

## 2021-08-05 NOTE — PLAN OF CARE
Patient went to the first Xray in his small bowel follow through after xray patient complains of increased nausea and abdominal pain patient placed back on low intermittent [id number] ml out so far

## 2021-08-05 NOTE — PROGRESS NOTES
Patient reported significant nausea and so NG tube was hooked back to suction.  Most of the contrast was removed.  8 hour films show basically no change.  Continue conservative treatment with n.p.o. and NG to suction.  Ambulation as much as possible.  Not a good surgical candidate.  This was discussed with the patient and his wife and both understood.    Taz Sandoval MD

## 2021-08-05 NOTE — PLAN OF CARE
Gastrografin administered via NG at 2210, NG clamped, xray updated (pt to have xray 8-10 hrs after gastrografin). NG unclamped, low intermittent suction restarted at 0100, dark green/black return upon restart. Patient denies pain, denies flatus, nausea improved w/ NG, bowel sounds hypoactive in LUQ & LLQ.

## 2021-08-05 NOTE — PROGRESS NOTES
"[facility] Internal Medicine Progress Note     Date of Service (when I saw the patient): 08/05/2021    REASON FOR ADMISSION / INTERVAL HISTORY:  Stuart Ortega is a 71 year old male who presents on 8/3/2021 with abdominal pain, nausea and vomiting. Continues to have abdominal distension/ pain. See details below.     ASSESSMENT/PLAN:     Small bowel obstruction.   Presented with n/v/abd pain.CT scan shows mid small bowel obstruction: There is a long segment of moderately dilated fluid-filled small bowel in the abdomen and upper pelvis and the more distal bowel is relatively decompressed. Was started on IV fluids/ bowel rest. Surgery seen 8/4 and recommended NG tube. S/p NG tube 8/4. Having >1000cc out last night and this AM.   Having SBFT today per surgery.  -continue  NG tube to low intermittent suction. Surgery to follow SBFT today      Mildly elevated lactic acid   On admission LA 2.5.  No signs of infection.  Most likely dehydration/ischemic bowel/SBO.  No signs of infection  Pt did not trigger any LA  Since admission. Continue fluids. Ck level now.     Mildly elevated lipase  Most likely reactive due to vomiting and small bowel obstruction.  No signs of pancreatitis on CT scan  -We will continue with fluids. Follow labs.    Hypo K  Replace per protocol     Hypertension, hyperlipidemia, GERD, depression, migraine headache  -we will continue  his home medications for now.      DISPO  Will be in hospital 2-4 days    ISAIAH BALLARD MD   Pg [phone]    DVT Prhylaxis: Low Risk/Ambulatory with no VTE prophylaxis indicated  Code Status: No Order    ROS:  As described in A/P and Exam.  Otherwise ALL are  negative.    PHYSICAL EXAM:  All vitals have been reviewed    Blood pressure 135/74, pulse 94, temperature 97.9  F (36.6  C), temperature source Oral, resp. rate 18, height 1.778 m (5' 10\"), weight 117.6 kg (259 lb 4.2 oz), SpO2 91 %.    I/O this shift:  In: -   Out: 1200 [Emesis/NG output:1200]    GENERAL " APPEARANCE: healthy, alert and no distress  EYES: conjunctiva clear, eyes grossly normal  HENT: external ears and nose normal   RESP: lungs clear to auscultation - no rales, rhonchi or wheezes  CV: regular rate and rhythm, normal S1 S2, no S3 or S4 and no murmur, click or rub   ABDOMEN: soft, distended/ diffuse mild tenderness, no HSM or masses and bowel sounds normal  MS: no clubbing, cyanosis; no edema  SKIN: clear without significant rashes or lesions  NEURO: -non-focal moves all 4 extr    ROUTINE  LABS (Last four results)  CMP  Recent Labs   Lab 08/05/21  0948 08/05/21  0509 08/04/21  0028   NA  --  136 135   POTASSIUM 3.4 3.3* 3.6   CHLORIDE  --  99 102   CO2  --  30 24   ANIONGAP  --  7 9   GLC  --  175* 183*   BUN  --  24 32*   CR  --  0.95 1.17   GFRESTIMATED  --  79 61   HAILEY  --  8.7 8.4*   PROTTOTAL  --  6.5* 7.0   ALBUMIN  --  2.8* 3.3*   BILITOTAL  --  0.7 1.2   ALKPHOS  --  66 83   AST  --  13 23   ALT  --  22 33     CBC  Recent Labs   Lab 08/04/21  0028   WBC 7.7   RBC 4.63   HGB 14.6   HCT 41.8   MCV 90   MCH 31.5   MCHC 34.9   RDW 13.1        INRNo lab results found in last 7 days.  Arterial Blood GasNo lab results found in last 7 days.    Recent Results (from the past 24 hour(s))   XR Abdomen Port 1 View    Narrative    ABDOMEN ONE VIEW PORTABLE  8/4/2021 6:31 PM     HISTORY: NG placement.    COMPARISON: CT 8/4/2021      Impression    IMPRESSION: The tip of the nasogastric tube and the proximal sidehole  are projected over the stomach. Dilated small bowel loops are again  demonstrated measuring up to 5.1 cm, compatible with mechanical small  bowel obstruction. No gross free air.    SINA SMITH MD         SYSTEM ID:  [id number]   XR Gastrografin Challenge    Narrative    XR GASTROGRAFIN CHALLENGE 8/5/2021 6:20 AM    HISTORY: Gastrografin challenge. Small bowel obstruction.    TECHNIQUE: The patient is given water soluble oral contrast by the  clinical service. A single view of the abdomen is  obtained  approximately 8 hours later.    COMPARISON: Prior study done at 1820 hours on August 4    FINDINGS: There is an NG tube in the stomach. Vaguely seen is water  soluble oral contrast in the stomach and possibly a small amount in  the nondilated duodenum. Multiple dilated small bowel loops are seen  to a caliber of all 6.1 cm. Comparable dilated small bowel loop on the  prior study measures 5.8 cm and therefore the amount of small bowel  distention is similar to the prior study. This is of concern for  partial or early small bowel obstruction with an ileus considered less  likely. Stool is present in the right colon and rectum.      Impression    IMPRESSION: Small bowel distention is similar to prior study, as  described above.    JOELLE SALGADO MD         SYSTEM ID:  [id number]

## 2021-08-05 NOTE — PLAN OF CARE
Patient put out 1200 ml before midnight and 1200 ml dark brown/green drainage after midnight. He denies flatus. He went down for xray returned and ambulated in the halls with stand by assist this am.

## 2021-08-06 LAB
ANION GAP SERPL CALCULATED.3IONS-SCNC: 5 MMOL/L (ref 3–14)
BASOPHILS # BLD AUTO: 0.1 10E3/UL (ref 0–0.2)
BASOPHILS NFR BLD AUTO: 1 %
BUN SERPL-MCNC: 23 MG/DL (ref 7–30)
CALCIUM SERPL-MCNC: 8.9 MG/DL (ref 8.5–10.1)
CHLORIDE BLD-SCNC: 103 MMOL/L (ref 94–109)
CO2 SERPL-SCNC: 32 MMOL/L (ref 20–32)
CREAT SERPL-MCNC: 0.89 MG/DL (ref 0.66–1.25)
EOSINOPHIL # BLD AUTO: 0.2 10E3/UL (ref 0–0.7)
EOSINOPHIL NFR BLD AUTO: 3 %
ERYTHROCYTE [DISTWIDTH] IN BLOOD BY AUTOMATED COUNT: 13.1 % (ref 10–15)
GFR SERPL CREATININE-BSD FRML MDRD: 85 ML/MIN/1.73M2
GLUCOSE BLD-MCNC: 150 MG/DL (ref 70–99)
HCT VFR BLD AUTO: 38.7 % (ref 40–53)
HGB BLD-MCNC: 13.2 G/DL (ref 13.3–17.7)
IMM GRANULOCYTES # BLD: 0.3 10E3/UL
IMM GRANULOCYTES NFR BLD: 3 %
LYMPHOCYTES # BLD AUTO: 1.4 10E3/UL (ref 0.8–5.3)
LYMPHOCYTES NFR BLD AUTO: 16 %
MCH RBC QN AUTO: 31.5 PG (ref 26.5–33)
MCHC RBC AUTO-ENTMCNC: 34.1 G/DL (ref 31.5–36.5)
MCV RBC AUTO: 92 FL (ref 78–100)
MONOCYTES # BLD AUTO: 1.3 10E3/UL (ref 0–1.3)
MONOCYTES NFR BLD AUTO: 15 %
NEUTROPHILS # BLD AUTO: 5.6 10E3/UL (ref 1.6–8.3)
NEUTROPHILS NFR BLD AUTO: 62 %
NRBC # BLD AUTO: 0 10E3/UL
NRBC BLD AUTO-RTO: 0 /100
PLATELET # BLD AUTO: 202 10E3/UL (ref 150–450)
POTASSIUM BLD-SCNC: 3.4 MMOL/L (ref 3.4–5.3)
POTASSIUM BLD-SCNC: 4.2 MMOL/L (ref 3.4–5.3)
RBC # BLD AUTO: 4.19 10E6/UL (ref 4.4–5.9)
SODIUM SERPL-SCNC: 140 MMOL/L (ref 133–144)
WBC # BLD AUTO: 8.8 10E3/UL (ref 4–11)

## 2021-08-06 PROCEDURE — 250N000011 HC RX IP 250 OP 636: Performed by: EMERGENCY MEDICINE

## 2021-08-06 PROCEDURE — 80048 BASIC METABOLIC PNL TOTAL CA: CPT | Performed by: INTERNAL MEDICINE

## 2021-08-06 PROCEDURE — 250N000013 HC RX MED GY IP 250 OP 250 PS 637: Performed by: INTERNAL MEDICINE

## 2021-08-06 PROCEDURE — 120N000001 HC R&B MED SURG/OB

## 2021-08-06 PROCEDURE — 84132 ASSAY OF SERUM POTASSIUM: CPT | Performed by: INTERNAL MEDICINE

## 2021-08-06 PROCEDURE — 85025 COMPLETE CBC W/AUTO DIFF WBC: CPT | Performed by: INTERNAL MEDICINE

## 2021-08-06 PROCEDURE — 250N000011 HC RX IP 250 OP 636: Performed by: HOSPITALIST

## 2021-08-06 PROCEDURE — 250N000011 HC RX IP 250 OP 636: Performed by: INTERNAL MEDICINE

## 2021-08-06 PROCEDURE — 36415 COLL VENOUS BLD VENIPUNCTURE: CPT | Performed by: INTERNAL MEDICINE

## 2021-08-06 PROCEDURE — 99207 PR CDG-MDM COMPONENT: MEETS MODERATE - DOWN CODED: CPT | Performed by: INTERNAL MEDICINE

## 2021-08-06 PROCEDURE — 99232 SBSQ HOSP IP/OBS MODERATE 35: CPT | Performed by: INTERNAL MEDICINE

## 2021-08-06 RX ORDER — POTASSIUM CHLORIDE 7.45 MG/ML
10 INJECTION INTRAVENOUS
Status: COMPLETED | OUTPATIENT
Start: 2021-08-06 | End: 2021-08-06

## 2021-08-06 RX ADMIN — HYDROMORPHONE HYDROCHLORIDE 0.4 MG: 0.2 INJECTION, SOLUTION INTRAMUSCULAR; INTRAVENOUS; SUBCUTANEOUS at 12:50

## 2021-08-06 RX ADMIN — POTASSIUM CHLORIDE 10 MEQ: 7.46 INJECTION, SOLUTION INTRAVENOUS at 08:16

## 2021-08-06 RX ADMIN — POTASSIUM CHLORIDE 10 MEQ: 7.46 INJECTION, SOLUTION INTRAVENOUS at 12:00

## 2021-08-06 RX ADMIN — HYDROMORPHONE HYDROCHLORIDE 0.4 MG: 0.2 INJECTION, SOLUTION INTRAMUSCULAR; INTRAVENOUS; SUBCUTANEOUS at 20:13

## 2021-08-06 RX ADMIN — HYDROMORPHONE HYDROCHLORIDE 0.4 MG: 0.2 INJECTION, SOLUTION INTRAMUSCULAR; INTRAVENOUS; SUBCUTANEOUS at 15:10

## 2021-08-06 RX ADMIN — FAMOTIDINE 20 MG: 20 INJECTION, SOLUTION INTRAVENOUS at 17:38

## 2021-08-06 RX ADMIN — FAMOTIDINE 20 MG: 20 INJECTION, SOLUTION INTRAVENOUS at 06:24

## 2021-08-06 RX ADMIN — SODIUM CHLORIDE, SODIUM LACTATE, POTASSIUM CHLORIDE, CALCIUM CHLORIDE AND DEXTROSE MONOHYDRATE: 5; 600; 310; 30; 20 INJECTION, SOLUTION INTRAVENOUS at 15:43

## 2021-08-06 RX ADMIN — VERAPAMIL HYDROCHLORIDE 240 MG: 240 TABLET ORAL at 17:36

## 2021-08-06 RX ADMIN — TRAZODONE HYDROCHLORIDE 50 MG: 50 TABLET ORAL at 22:16

## 2021-08-06 RX ADMIN — POTASSIUM CHLORIDE 10 MEQ: 7.46 INJECTION, SOLUTION INTRAVENOUS at 10:32

## 2021-08-06 RX ADMIN — POTASSIUM CHLORIDE 10 MEQ: 7.46 INJECTION, SOLUTION INTRAVENOUS at 13:37

## 2021-08-06 ASSESSMENT — ACTIVITIES OF DAILY LIVING (ADL)
ADLS_ACUITY_SCORE: 17

## 2021-08-06 NOTE — PLAN OF CARE
Patient had large soft bowel movement. States he feels better. Up walking in hallway with wife. Dr. Allan and Dr. Sandoval notified. Per Dr. Sandoval no change in plan at this time. NG continues to LIS.

## 2021-08-06 NOTE — PLAN OF CARE
Vital signs stable. Complains of abdominal pain, rates at 4-5/10 PRN dilaudid given with relief. Denies nausea. NG to LIS, 400 mL of brown output. IV fluids infusing. Stand by assist in the room.

## 2021-08-06 NOTE — PROGRESS NOTES
Final x-ray from small bowel follow-through shows contrast in the colon but still with some dilated loops of small bowel.  Patient had bowel movement and feels a little bit better.  Continue n.p.o. with NG suction and ambulation as we want to be as conservative as possible.    Taz Sandoval MD

## 2021-08-06 NOTE — PROGRESS NOTES
"[facility] Internal Medicine Progress Note     Date of Service (when I saw the patient): 08/06/2021    REASON FOR ADMISSION / INTERVAL HISTORY:  Stuart Ortega is a 71 year old male who presents on 8/3/2021 with abdominal pain, nausea and vomiting. Feeling a little better. Passing gas. See details below.     ASSESSMENT/PLAN:     Small bowel obstruction.   Presented with n/v/abd pain.CT scan shows mid small bowel obstruction: There is a long segment of moderately dilated fluid-filled small bowel in the abdomen and upper pelvis and the more distal bowel is relatively decompressed. Was started on IV fluids/ bowel rest. Surgery seen 8/4 and recommended NG tube. S/p NG tube 8/4.   800 ml out  this AM. SBFT was a poor study-contrast sucked out and poor films in 8 hrs.  -continue  NG tube to low intermittent suction. Surgery following.     Mildly elevated lactic acid   On admission LA 2.5.  No signs of infection.  Most likely dehydration/ischemic bowel/SBO.  No signs of infection  Resolved.      Mildly elevated lipase  Most likely reactive due to vomiting and small bowel obstruction.  No signs of pancreatitis on CT scan  Improved-no intervention    Hypo K  Replace per protocol     Hypertension, hyperlipidemia, GERD, depression, migraine headache  -we will continue  his home medications for now.      DISPO  Will be in hospital 2-3 days    ISAIAH BALLARD MD   Pg [phone]    DVT Prhylaxis: Low Risk/Ambulatory with no VTE prophylaxis indicated  Code Status: No Order    ROS:  As described in A/P and Exam.  Otherwise ALL are  negative.    PHYSICAL EXAM:  All vitals have been reviewed    Blood pressure 132/68, pulse 85, temperature 97.8  F (36.6  C), temperature source Oral, resp. rate 17, height 1.778 m (5' 10\"), weight 117.6 kg (259 lb 4.2 oz), SpO2 90 %.    No intake/output data recorded.    GENERAL APPEARANCE: healthy, alert and no distress  EYES: conjunctiva clear, eyes grossly normal  HENT: external ears and nose " normal   RESP: lungs clear to auscultation - no rales, rhonchi or wheezes  CV: regular rate and rhythm, normal S1 S2, no S3 or S4 and no murmur, click or rub   ABDOMEN: soft, mildly distended/ diffuse mild tenderness-improved exam since yesterday, no HSM or masses and bowel sounds normal  MS: no clubbing, cyanosis; no edema  SKIN: clear without significant rashes or lesions  NEURO: -non-focal moves all 4 extr    ROUTINE  LABS (Last four results)  CMP  Recent Labs   Lab 08/06/21  0503 08/05/21  1509 08/05/21  0948 08/05/21  0509 08/04/21  0028    139  --  136 135   POTASSIUM 3.4 3.8  3.7 3.4 3.3* 3.6   CHLORIDE 103 103  --  99 102   CO2 32 30  --  30 24   ANIONGAP 5 6  --  7 9   * 156*  --  175* 183*   BUN 23 22  --  24 32*   CR 0.89 0.89  --  0.95 1.17   GFRESTIMATED 85 85  --  79 61   HAILEY 8.9 9.3  --  8.7 8.4*   PROTTOTAL  --   --   --  6.5* 7.0   ALBUMIN  --   --   --  2.8* 3.3*   BILITOTAL  --   --   --  0.7 1.2   ALKPHOS  --   --   --  66 83   AST  --   --   --  13 23   ALT  --   --   --  22 33     CBC  Recent Labs   Lab 08/06/21  0503 08/04/21  0028   WBC 8.8 7.7   RBC 4.19* 4.63   HGB 13.2* 14.6   HCT 38.7* 41.8   MCV 92 90   MCH 31.5 31.5   MCHC 34.1 34.9   RDW 13.1 13.1    181     INRNo lab results found in last 7 days.  Arterial Blood GasNo lab results found in last 7 days.    No results found for this or any previous visit (from the past 24 hour(s)).

## 2021-08-06 NOTE — PLAN OF CARE
Patient A & O, up with standby assist. Denies nausea, denies pain, bowel sounds hypoactive in RLQ & LLQ, NG on low-intermittent suction, 800 mL brown output, VS stable, IV fluids infusing.

## 2021-08-06 NOTE — PROGRESS NOTES
Feeling better today. Passing some flatus and had a bowel movement. Abdomen feels normal according to patient. Still with few bowel sounds. Final x-ray shows contrast in the colon with still some dilated loops. We will continue n.p.o. with NG to suction and we will reevaluate tomorrow.    Taz Sandoval MD

## 2021-08-06 NOTE — PLAN OF CARE
Patient had 2 more bowel movements today. Potassium level was 3.4, received IV replacement. IV had discomfort during infusions. Rate decreased and warm pack applied. Site without redness. Patient complains of back pain and requesting pain med. Explains that he has history of back surgeries and uses pain medication at home. Given IV Dilaudid which was helpful. Ambulated in hallway.

## 2021-08-07 LAB — POTASSIUM BLD-SCNC: 3.5 MMOL/L (ref 3.4–5.3)

## 2021-08-07 PROCEDURE — 250N000013 HC RX MED GY IP 250 OP 250 PS 637: Performed by: INTERNAL MEDICINE

## 2021-08-07 PROCEDURE — 99207 PR CDG-MDM COMPONENT: MEETS MODERATE - DOWN CODED: CPT | Performed by: INTERNAL MEDICINE

## 2021-08-07 PROCEDURE — 250N000011 HC RX IP 250 OP 636: Performed by: HOSPITALIST

## 2021-08-07 PROCEDURE — 250N000011 HC RX IP 250 OP 636: Performed by: EMERGENCY MEDICINE

## 2021-08-07 PROCEDURE — 84132 ASSAY OF SERUM POTASSIUM: CPT | Performed by: INTERNAL MEDICINE

## 2021-08-07 PROCEDURE — 120N000001 HC R&B MED SURG/OB

## 2021-08-07 PROCEDURE — 99232 SBSQ HOSP IP/OBS MODERATE 35: CPT | Performed by: INTERNAL MEDICINE

## 2021-08-07 PROCEDURE — 36415 COLL VENOUS BLD VENIPUNCTURE: CPT | Performed by: INTERNAL MEDICINE

## 2021-08-07 RX ADMIN — VERAPAMIL HYDROCHLORIDE 240 MG: 240 TABLET ORAL at 17:36

## 2021-08-07 RX ADMIN — SODIUM CHLORIDE, SODIUM LACTATE, POTASSIUM CHLORIDE, CALCIUM CHLORIDE AND DEXTROSE MONOHYDRATE: 5; 600; 310; 30; 20 INJECTION, SOLUTION INTRAVENOUS at 13:59

## 2021-08-07 RX ADMIN — TRAZODONE HYDROCHLORIDE 50 MG: 50 TABLET ORAL at 22:01

## 2021-08-07 RX ADMIN — HYDROMORPHONE HYDROCHLORIDE 0.4 MG: 0.2 INJECTION, SOLUTION INTRAMUSCULAR; INTRAVENOUS; SUBCUTANEOUS at 20:42

## 2021-08-07 RX ADMIN — SODIUM CHLORIDE, SODIUM LACTATE, POTASSIUM CHLORIDE, CALCIUM CHLORIDE AND DEXTROSE MONOHYDRATE 100 ML: 5; 600; 310; 30; 20 INJECTION, SOLUTION INTRAVENOUS at 02:20

## 2021-08-07 RX ADMIN — HYDROMORPHONE HYDROCHLORIDE 0.4 MG: 0.2 INJECTION, SOLUTION INTRAMUSCULAR; INTRAVENOUS; SUBCUTANEOUS at 22:59

## 2021-08-07 RX ADMIN — FAMOTIDINE 20 MG: 20 INJECTION, SOLUTION INTRAVENOUS at 17:34

## 2021-08-07 RX ADMIN — HYDROMORPHONE HYDROCHLORIDE 0.4 MG: 0.2 INJECTION, SOLUTION INTRAMUSCULAR; INTRAVENOUS; SUBCUTANEOUS at 13:52

## 2021-08-07 RX ADMIN — FAMOTIDINE 20 MG: 20 INJECTION, SOLUTION INTRAVENOUS at 05:59

## 2021-08-07 ASSESSMENT — ACTIVITIES OF DAILY LIVING (ADL)
ADLS_ACUITY_SCORE: 17

## 2021-08-07 NOTE — PROGRESS NOTES
Still feeling better.  Passing minimal flatus.  Did not get a chance to ambulate much last night.    I/O last 3 completed shifts:  In: 755 [P.O.:30; I.V.:725]  Out: 1100 [Emesis/NG output:1100]    NG output since last night about 300.    Exam: Scant bowel sounds, high and tingling.  Abdomen soft.      Assessment and plan: SBO may be resolving albeit slowly.  Continue n.p.o. and NG decompression.  Continue ambulation.    Taz Sandoval MD

## 2021-08-07 NOTE — PROGRESS NOTES
"[facility] Internal Medicine Progress Note     Date of Service (when I saw the patient): 08/07/2021    REASON FOR ADMISSION / INTERVAL HISTORY:  Stuart Ortega is a 71 year old male who presents on 8/3/2021 with abdominal pain, nausea and vomiting. Feeling ok-as yesterday. Passing gas. See details below.     ASSESSMENT/PLAN:     Small bowel obstruction.   Presented with n/v/abd pain.CT scan shows mid small bowel obstruction: There is a long segment of moderately dilated fluid-filled small bowel in the abdomen and upper pelvis and the more distal bowel is relatively decompressed. Was started on IV fluids/ bowel rest. Surgery seen 8/4 and recommended NG tube. S/p NG tube 8/4. SBFT was a poor study-contrast sucked out and poor films in 8 hrs.  500 ml bile like fluid out  this AM.   -continue  NG tube to low intermittent suction. Surgery following.     Mildly elevated lactic acid   On admission LA 2.5.  No signs of infection.  Most likely dehydration/ischemic bowel/SBO.  No signs of infection  Resolved.      Mildly elevated lipase  Most likely reactive due to vomiting and small bowel obstruction.  No signs of pancreatitis on CT scan  Improved-no intervention    Hypo K  Replace per protocol     Hypertension, hyperlipidemia, GERD, depression, migraine headache  -we will continue  his home medications for now.      DISPO  Will be in hospital 2-3 days    ISAIAH BALLARD MD   Pg [phone]    DVT Prhylaxis: Low Risk/Ambulatory with no VTE prophylaxis indicated  Code Status: No Order    ROS:  As described in A/P and Exam.  Otherwise ALL are  negative.    PHYSICAL EXAM:  All vitals have been reviewed    Blood pressure 124/68, pulse 67, temperature 98.1  F (36.7  C), temperature source Oral, resp. rate 17, height 1.778 m (5' 10\"), weight 117.6 kg (259 lb 4.2 oz), SpO2 95 %.    No intake/output data recorded.    GENERAL APPEARANCE: healthy, alert and no distress  EYES: conjunctiva clear, eyes grossly normal  HENT: " external ears and nose normal   RESP: lungs clear to auscultation - no rales, rhonchi or wheezes  CV: regular rate and rhythm, normal S1 S2, no S3 or S4 and no murmur, click or rub   ABDOMEN: soft, mildly distended/ diffuse mild tenderness-improved exam since yesterday, no HSM or masses and bowel sounds normal  MS: no clubbing, cyanosis; no edema  SKIN: clear without significant rashes or lesions  NEURO: -non-focal moves all 4 extr    ROUTINE  LABS (Last four results)  CMP  Recent Labs   Lab 08/07/21  0445 08/06/21  1549 08/06/21  0503 08/05/21  1509 08/05/21  0509 08/04/21  0028   NA  --   --  140 139 136 135   POTASSIUM 3.5 4.2 3.4 3.8  3.7 3.3* 3.6   CHLORIDE  --   --  103 103 99 102   CO2  --   --  32 30 30 24   ANIONGAP  --   --  5 6 7 9   GLC  --   --  150* 156* 175* 183*   BUN  --   --  23 22 24 32*   CR  --   --  0.89 0.89 0.95 1.17   GFRESTIMATED  --   --  85 85 79 61   HAILEY  --   --  8.9 9.3 8.7 8.4*   PROTTOTAL  --   --   --   --  6.5* 7.0   ALBUMIN  --   --   --   --  2.8* 3.3*   BILITOTAL  --   --   --   --  0.7 1.2   ALKPHOS  --   --   --   --  66 83   AST  --   --   --   --  13 23   ALT  --   --   --   --  22 33     CBC  Recent Labs   Lab 08/06/21  0503 08/04/21  0028   WBC 8.8 7.7   RBC 4.19* 4.63   HGB 13.2* 14.6   HCT 38.7* 41.8   MCV 92 90   MCH 31.5 31.5   MCHC 34.1 34.9   RDW 13.1 13.1    181     INRNo lab results found in last 7 days.  Arterial Blood GasNo lab results found in last 7 days.    No results found for this or any previous visit (from the past 24 hour(s)).

## 2021-08-07 NOTE — PLAN OF CARE
Patient remains NPO.  NG to LIS with 300 ml green output.  Had small, soft BM x1 this shift.  Ambulating in hua with stand by assist.  0.4 mg IV Dilaudid given x1 for abdominal pain with good relief.  Able to let needs be known.  Wife at bedside.

## 2021-08-07 NOTE — PLAN OF CARE
Patient is A&O x4. Up with STBA. Patient continues to have NG placed on low intermittent suctioning. On evening shift patient had an Output from the tube of 500ml of brown/greenish waste. Patient is able to ambulate to the bathroom. No stools overnight. Patient has been able to pass flatus. Hypoactive bowel sounds in all 4 quadrants. Abdomen is soft with no pain on palpitation. NG output this morning of 200. Potassium this morning was at 3.5 no replacement needed.     Patient had abdominal/ low back pain rated 7/10 at 2013 and PRN IV dilaudid was given. Patient had trouble sleeping so a one time dose of Trazodone 50 mg was given orally per Telemed approval at 2200. Patient is supposed to be NPO is able to swallow some pills with a sip of water. Writer gave Trazodone. Patient was able to fall asleep until 0130. Patient's IV kept down streaming. Writer called ED to get new IV placed.        Carol Sullivan RN on 8/7/2021 at 2:07 AM

## 2021-08-08 ENCOUNTER — APPOINTMENT (OUTPATIENT)
Dept: GENERAL RADIOLOGY | Facility: CLINIC | Age: 71
DRG: 390 | End: 2021-08-08
Attending: INTERNAL MEDICINE
Payer: MEDICARE

## 2021-08-08 ENCOUNTER — APPOINTMENT (OUTPATIENT)
Dept: GENERAL RADIOLOGY | Facility: CLINIC | Age: 71
DRG: 390 | End: 2021-08-08
Attending: SURGERY
Payer: MEDICARE

## 2021-08-08 LAB
ANION GAP SERPL CALCULATED.3IONS-SCNC: 8 MMOL/L (ref 3–14)
BUN SERPL-MCNC: 14 MG/DL (ref 7–30)
CALCIUM SERPL-MCNC: 8.3 MG/DL (ref 8.5–10.1)
CHLORIDE BLD-SCNC: 106 MMOL/L (ref 94–109)
CO2 SERPL-SCNC: 26 MMOL/L (ref 20–32)
CREAT SERPL-MCNC: 0.81 MG/DL (ref 0.66–1.25)
GFR SERPL CREATININE-BSD FRML MDRD: 88 ML/MIN/1.73M2
GLUCOSE BLD-MCNC: 141 MG/DL (ref 70–99)
POTASSIUM BLD-SCNC: 3.4 MMOL/L (ref 3.4–5.3)
SODIUM SERPL-SCNC: 140 MMOL/L (ref 133–144)

## 2021-08-08 PROCEDURE — 99232 SBSQ HOSP IP/OBS MODERATE 35: CPT | Performed by: INTERNAL MEDICINE

## 2021-08-08 PROCEDURE — 250N000013 HC RX MED GY IP 250 OP 250 PS 637: Performed by: INTERNAL MEDICINE

## 2021-08-08 PROCEDURE — 99231 SBSQ HOSP IP/OBS SF/LOW 25: CPT | Performed by: SURGERY

## 2021-08-08 PROCEDURE — 99207 PR CDG-MDM COMPONENT: MEETS MODERATE - DOWN CODED: CPT | Performed by: INTERNAL MEDICINE

## 2021-08-08 PROCEDURE — 36415 COLL VENOUS BLD VENIPUNCTURE: CPT | Performed by: INTERNAL MEDICINE

## 2021-08-08 PROCEDURE — 999N000065 XR CHEST 1 VIEW

## 2021-08-08 PROCEDURE — 80048 BASIC METABOLIC PNL TOTAL CA: CPT | Performed by: INTERNAL MEDICINE

## 2021-08-08 PROCEDURE — 120N000001 HC R&B MED SURG/OB

## 2021-08-08 PROCEDURE — 250N000011 HC RX IP 250 OP 636: Performed by: HOSPITALIST

## 2021-08-08 PROCEDURE — 74019 RADEX ABDOMEN 2 VIEWS: CPT

## 2021-08-08 PROCEDURE — 250N000011 HC RX IP 250 OP 636: Performed by: EMERGENCY MEDICINE

## 2021-08-08 RX ORDER — POTASSIUM CHLORIDE 1500 MG/1
40 TABLET, EXTENDED RELEASE ORAL ONCE
Status: DISCONTINUED | OUTPATIENT
Start: 2021-08-08 | End: 2021-08-08

## 2021-08-08 RX ORDER — POTASSIUM CHLORIDE 7.45 MG/ML
10 INJECTION INTRAVENOUS
Status: DISCONTINUED | OUTPATIENT
Start: 2021-08-08 | End: 2021-08-08

## 2021-08-08 RX ORDER — ACETAMINOPHEN 325 MG/1
650 TABLET ORAL EVERY 4 HOURS PRN
Status: DISCONTINUED | OUTPATIENT
Start: 2021-08-08 | End: 2021-08-09 | Stop reason: HOSPADM

## 2021-08-08 RX ADMIN — SODIUM CHLORIDE, SODIUM LACTATE, POTASSIUM CHLORIDE, CALCIUM CHLORIDE AND DEXTROSE MONOHYDRATE 100 ML: 5; 600; 310; 30; 20 INJECTION, SOLUTION INTRAVENOUS at 00:22

## 2021-08-08 RX ADMIN — HYDROMORPHONE HYDROCHLORIDE 0.2 MG: 0.2 INJECTION, SOLUTION INTRAMUSCULAR; INTRAVENOUS; SUBCUTANEOUS at 17:38

## 2021-08-08 RX ADMIN — TRAZODONE HYDROCHLORIDE 50 MG: 50 TABLET ORAL at 22:07

## 2021-08-08 RX ADMIN — HYDROMORPHONE HYDROCHLORIDE 0.4 MG: 0.2 INJECTION, SOLUTION INTRAMUSCULAR; INTRAVENOUS; SUBCUTANEOUS at 04:24

## 2021-08-08 RX ADMIN — SODIUM CHLORIDE, SODIUM LACTATE, POTASSIUM CHLORIDE, CALCIUM CHLORIDE AND DEXTROSE MONOHYDRATE: 5; 600; 310; 30; 20 INJECTION, SOLUTION INTRAVENOUS at 12:15

## 2021-08-08 RX ADMIN — ACETAMINOPHEN 650 MG: 325 TABLET, FILM COATED ORAL at 09:28

## 2021-08-08 RX ADMIN — HYDROMORPHONE HYDROCHLORIDE 0.4 MG: 0.2 INJECTION, SOLUTION INTRAMUSCULAR; INTRAVENOUS; SUBCUTANEOUS at 20:47

## 2021-08-08 RX ADMIN — FAMOTIDINE 20 MG: 20 INJECTION, SOLUTION INTRAVENOUS at 06:41

## 2021-08-08 RX ADMIN — VERAPAMIL HYDROCHLORIDE 240 MG: 240 TABLET ORAL at 17:36

## 2021-08-08 RX ADMIN — FAMOTIDINE 20 MG: 20 INJECTION, SOLUTION INTRAVENOUS at 17:38

## 2021-08-08 ASSESSMENT — ACTIVITIES OF DAILY LIVING (ADL)
ADLS_ACUITY_SCORE: 17

## 2021-08-08 NOTE — PROGRESS NOTES
No abdominal pain.  Passing flatus.  No nausea.    I/O last 3 completed shifts:  In: 2199 [P.O.:30; I.V.:2169]  Out: 600 [Emesis/NG output:600]      Patient Vitals for the past 24 hrs:   BP Temp Temp src Pulse Resp SpO2   08/08/21 0801 (!) 140/87 97.9  F (36.6  C) Oral 71 16 96 %   08/08/21 0300 133/69 97.9  F (36.6  C) Oral 65 18 95 %   08/08/21 0240 133/73 98.7  F (37.1  C) Oral 66 18 95 %   08/07/21 1552 (!) 146/66 98  F (36.7  C) Oral 79 18 93 %   08/07/21 1437 136/75 98.5  F (36.9  C) Oral 70 16 93 %   08/07/21 1410 -- -- -- -- 16 --   08/07/21 1350 -- -- -- -- 18 --     NG output overnight-600      Abdomen is soft nondistended bowel sounds all 4 quadrants      Assessment and plan: Partial small bowel obstruction seems to be resolving.  X-ray from this morning shows much improvement.  We will remove the NG tube and advance to clear liquid diet.  Continue ambulation.    Taz Sandoval MD

## 2021-08-08 NOTE — PROGRESS NOTES
"[facility] Internal Medicine Progress Note     Date of Service (when I saw the patient): 08/08/2021    REASON FOR ADMISSION / INTERVAL HISTORY:  Stuart Ortega is a 71 year old male who presents on 8/3/2021 with abdominal pain, nausea and vomiting. Feeling ok-as yesterday. Passing gas. See details below.     ASSESSMENT/PLAN:     Small bowel obstruction.   Presented with n/v/abd pain.CT scan shows mid small bowel obstruction: There is a long segment of moderately dilated fluid-filled small bowel in the abdomen and upper pelvis and the more distal bowel is relatively decompressed. Was started on IV fluids/ bowel rest. Surgery seen 8/4 and recommended NG tube. S/p NG tube 8/4. SBFT was a poor study-contrast sucked out and poor films in 8 hrs. Surgery following. NG removed this AM-apparently xray abd showed improvement.  -continue clear liq diet      Mildly elevated lactic acid   On admission LA 2.5.  No signs of infection.  Most likely dehydration/ischemic bowel/SBO.  No signs of infection  Resolved.      Mildly elevated lipase  Most likely reactive due to vomiting and small bowel obstruction.  No signs of pancreatitis on CT scan  Improved-no intervention    Hypo K  Replace per protocol     Hypertension, hyperlipidemia, GERD, depression, migraine headache  -we will continue  his home medications for now.      DISPO  Will be in hospital 1-2  days    ISAIAH BALLARD MD   Pg [phone]    DVT Prhylaxis: Low Risk/Ambulatory with no VTE prophylaxis indicated  Code Status: No Order    ROS:  As described in A/P and Exam.  Otherwise ALL are  negative.    PHYSICAL EXAM:  All vitals have been reviewed    Blood pressure (!) 140/87, pulse 71, temperature 97.9  F (36.6  C), temperature source Oral, resp. rate 16, height 1.778 m (5' 10\"), weight 117.6 kg (259 lb 4.2 oz), SpO2 96 %.    No intake/output data recorded.    GENERAL APPEARANCE: healthy, alert and no distress  EYES: conjunctiva clear, eyes grossly normal  HENT: " external ears and nose normal   RESP: lungs clear to auscultation - no rales, rhonchi or wheezes  CV: regular rate and rhythm, normal S1 S2, no S3 or S4 and no murmur, click or rub   ABDOMEN: soft, ND/NT-, no HSM or masses and bowel sounds normal  MS: no clubbing, cyanosis; no edema  SKIN: clear without significant rashes or lesions  NEURO: -non-focal moves all 4 extr    ROUTINE  LABS (Last four results)  CMP  Recent Labs   Lab 08/08/21  0656 08/07/21  0445 08/06/21  1549 08/06/21  0503 08/05/21  1509 08/05/21  0509 08/04/21  0028     --   --  140 139 136 135   POTASSIUM 3.4 3.5 4.2 3.4 3.8  3.7 3.3* 3.6   CHLORIDE 106  --   --  103 103 99 102   CO2 26  --   --  32 30 30 24   ANIONGAP 8  --   --  5 6 7 9   *  --   --  150* 156* 175* 183*   BUN 14  --   --  23 22 24 32*   CR 0.81  --   --  0.89 0.89 0.95 1.17   GFRESTIMATED 88  --   --  85 85 79 61   HAILEY 8.3*  --   --  8.9 9.3 8.7 8.4*   PROTTOTAL  --   --   --   --   --  6.5* 7.0   ALBUMIN  --   --   --   --   --  2.8* 3.3*   BILITOTAL  --   --   --   --   --  0.7 1.2   ALKPHOS  --   --   --   --   --  66 83   AST  --   --   --   --   --  13 23   ALT  --   --   --   --   --  22 33     CBC  Recent Labs   Lab 08/06/21  0503 08/04/21  0028   WBC 8.8 7.7   RBC 4.19* 4.63   HGB 13.2* 14.6   HCT 38.7* 41.8   MCV 92 90   MCH 31.5 31.5   MCHC 34.1 34.9   RDW 13.1 13.1    181     INRNo lab results found in last 7 days.  Arterial Blood GasNo lab results found in last 7 days.    Recent Results (from the past 24 hour(s))   XR Abdomen 2 Views    Narrative    EXAM: XR ABDOMEN 2VIEWS  LOCATION: [facility]  DATE/TIME: 8/8/2021 6:03 AM    INDICATION: follow SBO  COMPARISON: CT abdomen pelvis 08/04/2021. Abdomen radiographs 8/4/2021 and 8/5/2021.      Impression    IMPRESSION: Previously administered oral contrast is distributed throughout the colon, evidence against complete bowel obstruction. There are a few loops of persistently  dilated small bowel in the left mid abdomen. The findings suggest partial   obstruction. No pneumoperitoneum or pneumatosis. Enteric tube tip in the stomach and sidehole at the gastroesophageal junction. Post lumbar fusion.   XR Chest 1 View    Narrative    EXAM: XR CHEST 1 VIEW  LOCATION: [facility]  DATE/TIME: 8/8/2021 6:03 AM    INDICATION: NG placement  COMPARISON: None.      Impression    IMPRESSION: Nasogastric tube terminates in the stomach. The sidehole is present slightly above the diaphragm. Advancement several centimeters suggested for more optimal positioning. Lungs appear clear. Heart size is at the upper limits of normal. No   pleural fluid or pneumothorax.

## 2021-08-08 NOTE — PLAN OF CARE
Patient NG removed at 0845.  Denies pain, nausea.  BS+, passing flatus.  Tolerating small amounts of clear liquid diet.  Ambulating in the hua independently.

## 2021-08-08 NOTE — PLAN OF CARE
"Patient is A&O x4. Up with STBA. Patient had chronic back pain through the night received IV dilaudid x3. Patient appeared to be able to rest eyes after doses stating, \"It really does help and is a lot gentler the medications I have at home.\" Patient denies having any nausea. Abdomen is soft, no pain with palpation. Hypoactive bowel sounds heard in all 4 quadrants. Patient is able to pass flatus. Last BM on 8/7/2021. Patient did not sleep well through the night. Had to be reminded frequently to keep arm straight so IV could keep running. Writer asked patient if he wanted to try a different spot. Patient stated, \"no I have been poked to many times.\" Writer wrapped towel around arm and it seemed to help. At 0430 patient was sleeping and accidentally pulled out NG tube from right nostril. New NG placed in left nostril. Patient went down for chest xray for NG placement, along with abdominal xray 2 view. BMP ordered and will be drawn this AM to check potassium level.         /69 (BP Location: Right arm)   Pulse 65   Temp 97.9  F (36.6  C) (Oral)   Resp 18   Ht 1.778 m (5' 10\")   Wt 117.6 kg (259 lb 4.2 oz)   SpO2 95%   BMI 37.20 kg/m      Carol Sullivan RN on 8/8/2021 at 5:45 AM    "

## 2021-08-09 ENCOUNTER — APPOINTMENT (OUTPATIENT)
Dept: GENERAL RADIOLOGY | Facility: CLINIC | Age: 71
DRG: 390 | End: 2021-08-09
Attending: SURGERY
Payer: MEDICARE

## 2021-08-09 VITALS
TEMPERATURE: 97.8 F | DIASTOLIC BLOOD PRESSURE: 74 MMHG | BODY MASS INDEX: 37.12 KG/M2 | OXYGEN SATURATION: 94 % | HEIGHT: 70 IN | RESPIRATION RATE: 16 BRPM | SYSTOLIC BLOOD PRESSURE: 126 MMHG | HEART RATE: 76 BPM | WEIGHT: 259.26 LBS

## 2021-08-09 LAB
HOLD SPECIMEN: NORMAL
HOLD SPECIMEN: NORMAL
POTASSIUM BLD-SCNC: 3.4 MMOL/L (ref 3.4–5.3)
POTASSIUM BLD-SCNC: 3.9 MMOL/L (ref 3.4–5.3)

## 2021-08-09 PROCEDURE — 36415 COLL VENOUS BLD VENIPUNCTURE: CPT | Performed by: INTERNAL MEDICINE

## 2021-08-09 PROCEDURE — 250N000013 HC RX MED GY IP 250 OP 250 PS 637: Performed by: INTERNAL MEDICINE

## 2021-08-09 PROCEDURE — 74019 RADEX ABDOMEN 2 VIEWS: CPT

## 2021-08-09 PROCEDURE — 84132 ASSAY OF SERUM POTASSIUM: CPT | Performed by: INTERNAL MEDICINE

## 2021-08-09 PROCEDURE — 99238 HOSP IP/OBS DSCHRG MGMT 30/<: CPT | Performed by: INTERNAL MEDICINE

## 2021-08-09 RX ORDER — POTASSIUM CHLORIDE 1500 MG/1
40 TABLET, EXTENDED RELEASE ORAL ONCE
Status: COMPLETED | OUTPATIENT
Start: 2021-08-09 | End: 2021-08-09

## 2021-08-09 RX ORDER — FAMOTIDINE 20 MG/1
20 TABLET, FILM COATED ORAL EVERY 12 HOURS
Status: DISCONTINUED | OUTPATIENT
Start: 2021-08-09 | End: 2021-08-09 | Stop reason: HOSPADM

## 2021-08-09 RX ADMIN — FAMOTIDINE 20 MG: 20 TABLET ORAL at 07:44

## 2021-08-09 RX ADMIN — POTASSIUM CHLORIDE 40 MEQ: 20 TABLET, EXTENDED RELEASE ORAL at 07:44

## 2021-08-09 RX ADMIN — ACETAMINOPHEN 650 MG: 325 TABLET, FILM COATED ORAL at 15:10

## 2021-08-09 RX ADMIN — VERAPAMIL HYDROCHLORIDE 240 MG: 240 TABLET ORAL at 16:56

## 2021-08-09 ASSESSMENT — ACTIVITIES OF DAILY LIVING (ADL)
ADLS_ACUITY_SCORE: 17

## 2021-08-09 NOTE — PROGRESS NOTES
Still passing flatus. Tolerating clear liquids. No nausea or abd pain.    I/O last 3 completed shifts:  In: 1318 [P.O.:240; I.V.:1078]  Out: -     Patient Vitals for the past 24 hrs:   BP Temp Temp src Pulse Resp SpO2   08/09/21 0702 129/77 98.1  F (36.7  C) Oral 64 18 96 %   08/09/21 0304 (!) 145/55 97.8  F (36.6  C) Oral 66 18 94 %   08/08/21 2212 131/73 98.5  F (36.9  C) Oral 74 18 94 %   08/08/21 1755 -- -- -- -- 18 --   08/08/21 1740 -- -- -- -- 18 --   08/08/21 1527 (!) 140/71 98.4  F (36.9  C) Oral 73 16 96 %     Xray - still with some dilated small bowel in LUQ    ROS  CV - No CP or SOB  Resp - No SOB or dyspnea  GI - No nausea or vomiting   - No difficulty with urination    Exam:  AXO3 NAD  Neuro - Strength 5/5 all major groups, sensation intact, PERRL  Lungs - CTA  CV - RRR  Abd - Soft, non-distended, non-tender, +BS  Extr - No edema    A/P: SBO resolving.  Dillated bowel may be chronic.  Will advance to full liquids and if pt tolerates this, Ok for discharge this afternoon.    Taz Sandoval MD

## 2021-08-09 NOTE — PLAN OF CARE
[facility] DISCHARGE NOTE    Patient discharged to home at 6:11 PM via ambulation. Accompanied by spouse and staff. Discharge instructions reviewed with patient, opportunity offered to ask questions. Prescriptions sent to patients preferred pharmacy. All belongings sent with patient.    Yaneth Sotelo RN

## 2021-08-09 NOTE — PLAN OF CARE
"Pt is A/O, able to make needs known. Blood pressure (!) 145/55, pulse 66, temperature 97.8  F (36.6  C), temperature source Oral, resp. rate 18, height 1.778 m (5' 10\"), weight 117.6 kg (259 lb 4.2 oz), SpO2 94 %. Stable on room air. Denies pain, nausea. Tolerating sips of clears without problem. Abdominal x-ray completed this AM. Results pending. Pt lost IV access and MD okayed no IV at this time. K+3.4. Replacement ordered. Up with SBA to bathroom. Continue to assess per plan of care.    "

## 2021-08-09 NOTE — PLAN OF CARE
"Patient is A&O x4. Up Ind. Patient had pain rated 5/10 at 2040. Writer gave PRN dilaudid around that time. Patient appeared to fall asleep after. Bowel sounds hypoactive in all 4 quadrants. Abodmin is soft, just feeling some abdominal fullness patient stated. Patient has been up to the bathroom to urinate, no bowel movement this evening.     /73 (BP Location: Right arm)   Pulse 74   Temp 98.5  F (36.9  C) (Oral)   Resp 18   Ht 1.778 m (5' 10\")   Wt 117.6 kg (259 lb 4.2 oz)   SpO2 94%   BMI 37.20 kg/m      Carol Sullivan RN on 8/8/2021 at 10:34 PM    "

## 2021-08-09 NOTE — PROGRESS NOTES
Patient is tolerating full liquid diet without nausea or vomiting.  He reports no abdominal pain.  Passing flatus.  Okay for discharge today on full liquid diet.  I discussed advancing his diet at home slowly.  I think he is chronically partially obstructed and this can be managed through diet.  Patient understood and will advance his diet slowly.    Taz Sandoval MD

## 2021-08-09 NOTE — DISCHARGE SUMMARY
[facility]  Hospitalist Discharge Summary      Date of Admission:  8/3/2021  Date of Discharge:  8/9/2021  Discharging Provider: Duarte Lang MD      Discharge Diagnoses   Principal Problem:    Small bowel obstruction (H) (8/4/2021)  Active Problems:    Hyperlipidemia LDL goal <130 (10/31/2010)    Hypertension (10/26/2004)    Major depression (10/7/2014)    Hyperglycemia (8/4/2021)    Elevated lactic acid level (8/4/2021)    Elevated lipase (8/4/2021)    BPH without obstruction/lower urinary tract symptoms (9/20/2004)    GERD (gastroesophageal reflux disease) (10/7/2014)      Follow-ups Needed After Discharge   Follow-up Appointments     Follow-up and recommended labs and tests       Follow up with primary care provider, Renee Downing, within 7 days for   hospital follow- up.           Unresulted Labs Ordered in the Past 30 Days of this Admission     No orders found from 7/4/2021 to 8/4/2021.          Discharge Disposition   Discharged to home  Condition at discharge: Good    Hospital Course    REASON FOR ADMISSION / INTERVAL HISTORY:  Stuart Ortega is a 71 year old male who presents on 8/3/2021 with abdominal pain, nausea and vomiting. Feeling ok-as yesterday. Passing gas. See details below.      ASSESSMENT/PLAN:      Small bowel obstruction.   Presented with n/v/abd pain.CT scan shows mid small bowel obstruction: There is a long segment of moderately dilated fluid-filled small bowel in the abdomen and upper pelvis and the more distal bowel is relatively decompressed. Was started on IV fluids/ bowel rest. Surgery seen 8/4 and recommended NG tube. S/p NG tube 8/4. SBFT was a poor study-contrast sucked out and poor films in 8 hrs. Surgery following. NG removed 8/9/2021   - Tolerated clear liquid diet   - Consider colonoscopy as outpatient     Mildly elevated lactic acid   On admission LA 2.5.  No signs of infection.  Most likely dehydration/ischemic bowel/SBO.  No signs of  infection  Resolved.      Mildly elevated lipase  Most likely reactive due to vomiting and small bowel obstruction.  No signs of pancreatitis on CT scan  Improved-no intervention     Hypo K  Replace per protocol     Hypertension, hyperlipidemia, GERD, depression, migraine headache  -we will continue  his home medications for now.      Consultations This Hospital Stay   SURGERY GENERAL IP CONSULT    Code Status   No Order    Time Spent on this Encounter   I, Duarte Lang MD, personally saw the patient today and spent less than or equal to 30 minutes discharging this patient.       Duarte Lang MD  [facility] MEDICAL SURGICAL  [address] [facility] [address]  Phone: [phone]  Fax: [phone]  ______________________________________________________________________    Physical Exam   Vital Signs: Temp: 97.8  F (36.6  C) Temp src: Oral BP: 126/74 Pulse: 76   Resp: 16 SpO2: 94 % O2 Device: None (Room air)    Weight: 259 lbs 4.18 oz  Constitutional: awake, alert, cooperative, no apparent distress, and appears stated age       Primary Care Physician   Renee Downing    Discharge Orders      Reason for your hospital stay    small bowel obstruction     Follow-up and recommended labs and tests     Follow up with primary care provider, Renee Downing, within 7 days for hospital follow- up.     Activity    Your activity upon discharge: activity as tolerated     Diet    Follow this diet upon discharge: Orders Placed This Encounter      Full Liquid Diet advance slowly       Significant Results and Procedures   Results for orders placed or performed during the hospital encounter of 08/03/21   CT Abdomen Pelvis w Contrast    Narrative    EXAM: CT ABDOMEN AND PELVIS WITH CONTRAST  LOCATION: [facility]  DATE/TIME: 8/4/2021 2:37 AM    INDICATION: Abdominal distension.    COMPARISON: 05/11/2013.    TECHNIQUE: CT scan of the abdomen and pelvis was performed following injection  of IV contrast. Multiplanar reformats were obtained. Dose reduction techniques were used.    CONTRAST: 100 mL Isovue 370.    FINDINGS:  LOWER CHEST: A trace amount of bilateral pleural fluid.    HEPATOBILIARY: A tiny low-attenuation focus in the right lobe of the liver, too small to characterize.    SPLEEN: Unremarkable.    PANCREAS: Unremarkable.    ADRENAL GLANDS: Unremarkable.    KIDNEYS/BLADDER: There are a few bilateral renal cysts, including a cyst containing a few internal septations in the upper pole of the right kidney. These were also present on the comparison study dated 05/11/2013 and are therefore of unlikely clinical   significance.    BOWEL: Small hiatal hernia. A long segment of moderately dilated fluid-filled small bowel is present in the abdomen and upper pelvis. There is an abrupt transition to decompressed more distal small bowel in the anterior aspect of the right hemipelvis.   The colon is nondistended. A few colonic diverticula are present without evidence of diverticulitis. The appendix is likely visualized and unremarkable. No bowel wall thickening, pneumatosis or free intraperitoneal gas.    LYMPH NODES: Unremarkable.    PELVIC ORGANS: Moderate enlargement of the prostate gland.    MUSCULOSKELETAL: Fusion hardware in the lumbar spine.    OTHER: Atherosclerotic calcification in the abdominal aorta.      Impression    IMPRESSION:   1. Mid small bowel obstruction: There is a long segment of moderately dilated fluid-filled small bowel in the abdomen and upper pelvis and the more distal bowel is relatively decompressed.  2. Colonic diverticulosis without evidence of diverticulitis.      XR Gastrografin Challenge    Narrative    XR GASTROGRAFIN CHALLENGE 8/5/2021 6:20 AM    HISTORY: Gastrografin challenge. Small bowel obstruction.    TECHNIQUE: The patient is given water soluble oral contrast by the  clinical service. A single view of the abdomen is obtained  approximately 8 hours  later.    COMPARISON: Prior study done at 1820 hours on August 4    FINDINGS: There is an NG tube in the stomach. Vaguely seen is water  soluble oral contrast in the stomach and possibly a small amount in  the nondilated duodenum. Multiple dilated small bowel loops are seen  to a caliber of all 6.1 cm. Comparable dilated small bowel loop on the  prior study measures 5.8 cm and therefore the amount of small bowel  distention is similar to the prior study. This is of concern for  partial or early small bowel obstruction with an ileus considered less  likely. Stool is present in the right colon and rectum.      Impression    IMPRESSION: Small bowel distention is similar to prior study, as  described above.    JOELLE SALGADO MD         SYSTEM ID:  [id number]     XR Abdomen Port 1 View    Narrative    ABDOMEN ONE VIEW PORTABLE  8/4/2021 6:31 PM     HISTORY: NG placement.    COMPARISON: CT 8/4/2021      Impression    IMPRESSION: The tip of the nasogastric tube and the proximal sidehole  are projected over the stomach. Dilated small bowel loops are again  demonstrated measuring up to 5.1 cm, compatible with mechanical small  bowel obstruction. No gross free air.    SINA SMITH MD         SYSTEM ID:  [id number]     XR Gastrografin Sm Bowel Follow Through    Narrative    XR GASTROGRAFIN SMALL-BOWEL FOLLOW THROUGH 8/6/2021 10:29 AM    Gastrografin challenge dated 8/6/2021    HISTORY: Small-bowel obstruction. Evaluate for location.    COMPARISONS: 8/5/2021.    TECHNIQUE: Multiple images were obtained of the abdomen following  contrast administration.    FINDINGS: NG tube tip extends over the mid stomach. Four-hour films  show the majority of the contrast is still within the stomach. Some  contrast is seen within multiple dilated loops of small bowel through  the abdomen. Eight-hour films show similar distribution and dilated  loops of small bowel. Twenty-six-hour films show contrast has passed  through into the ascending and  proximal transverse colon.      Impression    IMPRESSION:  Partial small-bowel obstruction with contrast passing into the colon  on 26-hour films.    GERALD LOPEZ MD         SYSTEM ID:  [id number]     XR Abdomen 2 Views    Narrative    EXAM: XR ABDOMEN 2VIEWS  LOCATION: [facility]  DATE/TIME: 8/8/2021 6:03 AM    INDICATION: follow SBO  COMPARISON: CT abdomen pelvis 08/04/2021. Abdomen radiographs 8/4/2021 and 8/5/2021.      Impression    IMPRESSION: Previously administered oral contrast is distributed throughout the colon, evidence against complete bowel obstruction. There are a few loops of persistently dilated small bowel in the left mid abdomen. The findings suggest partial   obstruction. No pneumoperitoneum or pneumatosis. Enteric tube tip in the stomach and sidehole at the gastroesophageal junction. Post lumbar fusion.     XR Chest 1 View    Narrative    EXAM: XR CHEST 1 VIEW  LOCATION: [facility]  DATE/TIME: 8/8/2021 6:03 AM    INDICATION: NG placement  COMPARISON: None.      Impression    IMPRESSION: Nasogastric tube terminates in the stomach. The sidehole is present slightly above the diaphragm. Advancement several centimeters suggested for more optimal positioning. Lungs appear clear. Heart size is at the upper limits of normal. No   pleural fluid or pneumothorax.     XR Abdomen 2 Views    Narrative    EXAM: XR ABDOMEN 2VIEWS  LOCATION: [facility]  DATE/TIME: 8/9/2021 5:55 AM    INDICATION: follow PSBO  COMPARISON: CT abdomen and pelvis 08/04/2021. Radiographs of the abdomen 8/5/2021 and 8/8/2021.      Impression    IMPRESSION: Nasogastric tube has been removed. There is oral contrast distributed throughout the colon. There are a few loops of persistently dilated small bowel in the left abdomen. Findings suggest partial bowel obstruction. Appearance is similar to   yesterday. No pneumoperitoneum or pneumatosis. Post  lumbar fusion.          Discharge Medications   Current Discharge Medication List      CONTINUE these medications which have NOT CHANGED    Details   albuterol (PROVENTIL HFA) 108 (90 Base) MCG/ACT inhaler Inhale 1-2 puffs into the lungs      atorvastatin (LIPITOR) 40 MG tablet Take 40 mg by mouth daily      CHLORPHENIRAMINE MALEATE CR 8 MG OR TBCR 1 TABLET EVERY 8 TO 12 HOURS AS NEEDED      doxazosin (CARDURA) 2 MG tablet Take 2 mg by mouth At Bedtime      famotidine (PEPCID) 40 MG tablet Take 40 mg by mouth daily      FIBER OR 3 capsules three times daily    Associated Diagnoses: Health examination of defined subpopulation      FLEXERIL 10 MG OR TABS 1 TABLET 3 TIMES DAILY    Associated Diagnoses: Chronic sinus infection      fluocinonide (LIDEX) 0.05 % ointment Apply topically 2 times daily > 1 year since seen in [facility] last on 10-. Needs to be seen before any further refills authorized. [phone] to schedule. Thanks.  Qty: 30 g, Refills: 0    Associated Diagnoses: CD (contact dermatitis)      furosemide (LASIX) 20 MG tablet Take 20 mg by mouth daily      sertraline (ZOLOFT) 50 MG tablet TAKE ONE TABLET BY MOUTH EVERY DAY      traZODone (DESYREL) 50 MG tablet Take 50 mg by mouth At Bedtime      verapamil ER (CALAN-SR) 120 MG CR tablet Take 2 tablets by mouth every evening      FLONASE INHA 50 MCG/DOSE NA INHALE 2 SPRAYS IN EACH NOSTRIL ONCE DAILY return to clinic for refills  Qty: 1 Container, Refills: 0    Associated Diagnoses: Chronic sinus infection      IMITREX 50 MG OR TABS 1 TABLET 1 TIME ONLY,REPEAT AFTER 2 HR AS NEEDED    Associated Diagnoses: Health examination of defined subpopulation      MULTIDAY VITES OR TABS 1 tablet daily    Associated Diagnoses: Health examination of defined subpopulation         STOP taking these medications       ASPIRIN 81 MG OR TABS Comments:   Reason for Stopping:             Allergies   Allergies   Allergen Reactions     Celebrex [Celecoxib] Anaphylaxis and  Difficulty breathing     Amoxicillin Itching and Swelling     swelling in lips     Fiorinal Other (See Comments)     Burning red spots on B/L palms     Sulfa Drugs Other (See Comments)     Passes out

## 2023-10-15 NOTE — SIGNIFICANT EVENT
Cross cover  Paged by RN - unable to keep home meds down with NG tube output. Will hold for now. IV dilaudid prn.  
forehead

## 2024-04-07 ENCOUNTER — HEALTH MAINTENANCE LETTER (OUTPATIENT)
Age: 74
End: 2024-04-07

## 2024-09-17 ENCOUNTER — OFFICE VISIT (OUTPATIENT)
Dept: DERMATOLOGY | Facility: CLINIC | Age: 74
End: 2024-09-17
Payer: COMMERCIAL

## 2024-09-17 DIAGNOSIS — D18.01 ANGIOMA OF SKIN: ICD-10-CM

## 2024-09-17 DIAGNOSIS — L30.0 NUMMULAR DERMATITIS: ICD-10-CM

## 2024-09-17 DIAGNOSIS — L21.9 DERMATITIS, SEBORRHEIC: ICD-10-CM

## 2024-09-17 DIAGNOSIS — L82.1 SEBORRHEIC KERATOSES: ICD-10-CM

## 2024-09-17 DIAGNOSIS — L81.4 LENTIGO: ICD-10-CM

## 2024-09-17 DIAGNOSIS — D23.9 DERMAL NEVUS: Primary | ICD-10-CM

## 2024-09-17 PROCEDURE — 99203 OFFICE O/P NEW LOW 30 MIN: CPT | Performed by: DERMATOLOGY

## 2024-09-17 RX ORDER — FLUOCINONIDE 0.5 MG/G
CREAM TOPICAL 2 TIMES DAILY
Qty: 120 G | Refills: 6 | Status: SHIPPED | OUTPATIENT
Start: 2024-09-17

## 2024-09-17 RX ORDER — CICLOPIROX OLAMINE 7.7 MG/G
CREAM TOPICAL AT BEDTIME
Qty: 60 G | Refills: 6 | Status: SHIPPED | OUTPATIENT
Start: 2024-09-17

## 2024-09-17 NOTE — LETTER
2024      Stuart Ortega  [address]  [place] [address]      Dear Colleague,    Thank you for referring your patient, Stuart Ortega, to the [facility]. Please see a copy of my visit note below.    Stuart Ortega is an extremely pleasant 74 year old year old male patient here today for spots on skin and rash.  .  Patient has no other skin complaints today.  Remainder of the HPI, Meds, PMH, Allergies, FH, and SH was reviewed in chart.    No past medical history on file.    Past Surgical History:   Procedure Laterality Date     ROTATOR CUFF REPAIR RT/LT      right shoulder     SURGICAL HISTORY OF -       Parotid tumor--benign        Family History   Problem Relation Age of Onset     Alzheimer Disease Father      Prostate Cancer Father         at age 74     Cardiovascular Paternal Grandfather      Hypertension Brother      Lipids Brother      Lipids Son      Lipids Daughter        Social History     Socioeconomic History     Marital status:      Spouse name: Not on file     Number of children: Not on file     Years of education: Not on file     Highest education level: Not on file   Occupational History     Employer: RETIRED   Tobacco Use     Smoking status: Former     Current packs/day: 0.00     Types: Cigarettes     Quit date: 1984     Years since quittin.7     Smokeless tobacco: Never   Substance and Sexual Activity     Alcohol use: No     Drug use: No     Sexual activity: Yes     Partners: Female     Birth control/protection: Surgical     Comment: wife tubal/hysterectomy   Other Topics Concern     Not on file   Social History Narrative     Not on file     Social Determinants of Health     Financial Resource Strain: Not on file   Food Insecurity: Not on file   Transportation Needs: Not on file   Physical Activity: Not on file   Stress: Not on file   Social Connections: Not on file   Interpersonal Safety: Not on file   Housing Stability: Not on file        Outpatient Encounter Medications as of 9/17/2024   Medication Sig Dispense Refill     albuterol (PROVENTIL HFA) 108 (90 Base) MCG/ACT inhaler Inhale 1-2 puffs into the lungs       atorvastatin (LIPITOR) 40 MG tablet Take 40 mg by mouth daily       CHLORPHENIRAMINE MALEATE CR 8 MG OR TBCR 1 TABLET EVERY 8 TO 12 HOURS AS NEEDED       doxazosin (CARDURA) 2 MG tablet Take 2 mg by mouth At Bedtime       famotidine (PEPCID) 40 MG tablet Take 40 mg by mouth daily       FIBER OR 3 capsules three times daily       FLEXERIL 10 MG OR TABS 1 TABLET 3 TIMES DAILY       FLONASE INHA 50 MCG/DOSE NA INHALE 2 SPRAYS IN EACH NOSTRIL ONCE DAILY return to clinic for refills 1 Container 0     fluocinonide (LIDEX) 0.05 % ointment Apply topically 2 times daily > 1 year since seen in [facility] Clinic last on 10-. Needs to be seen before any further refills authorized. [phone] to schedule. Thanks. 30 g 0     furosemide (LASIX) 20 MG tablet Take 20 mg by mouth daily       IMITREX 50 MG OR TABS 1 TABLET 1 TIME ONLY,REPEAT AFTER 2 HR AS NEEDED       MULTIDAY VITES OR TABS 1 tablet daily       sertraline (ZOLOFT) 50 MG tablet TAKE ONE TABLET BY MOUTH EVERY DAY       traZODone (DESYREL) 50 MG tablet Take 50 mg by mouth At Bedtime       verapamil ER (CALAN-SR) 120 MG CR tablet Take 2 tablets by mouth every evening       No facility-administered encounter medications on file as of 9/17/2024.             O:   NAD, WDWN, Alert & Oriented, Mood & Affect wnl, Vitals stable   General appearance normal   Vitals stable   Alert, oriented and in no acute distress     Greasy red scaly patches on ears  Nummular eczematous plaqueso n trunk    Stuck on papules and brown macules on trunk and ext   Red papules on trunk  Flesh colored papules on trunk     The remainder of the full exam was normal; the following areas were examined:  conjunctiva/lids, , neck, peripheral vascular system, abdomen, lymph nodes, digits/nails, eccrine and apocrine glands,  scalp/hair, face, neck, chest, abdomen, buttocks, back, RUE, LUE, RLE, LLE       Eyes: Conjunctivae/lids:Normal     ENT: Lips, mucosa: normal    MSK:Normal    Cardiovascular: peripheral edema none    Pulm: Breathing Normal    Lymph Nodes: No Head and Neck Lymphadenopathy     Neuro/Psych: Orientation:Alert and Orientedx3 ; Mood/Affect:normal       A/P:  1. Seborrheic keratosis, lentigo, angioma, dermal nevus  2. Nummular derm   Skin care discussed with patient   Zyrtec daily  Lidex twice daily  3. Seb derm   Loprox bedtime  Lidex daily   It was a pleasure speaking to Stuart Ortega today.  Previous clinic notes and pertinent laboratory tests were reviewed prior to Stuart Ortega's visit.  Signs and Symptoms of skin cancer discussed with patient.  Patient encouraged to perform monthly skin exams.  UV precautions reviewed with patient.  Risks of non-melanoma skin cancer discussed with patient   Return to clinic 3 months      Again, thank you for allowing me to participate in the care of your patient.        Sincerely,        Sage Gerard MD

## 2024-09-17 NOTE — PATIENT INSTRUCTIONS
You can try taking an antihistamine once a day. Try a Claritin or Zyrtec. These are over the counter.    Follow the instructions on the tubes of creams.    Lidex can be used on body (twice a day) and ears (in the morning).    Loprox for your ears in the evening.

## 2024-09-17 NOTE — PROGRESS NOTES
Stuart Ortega is an extremely pleasant 74 year old year old male patient here today for spots on skin and rash.  .  Patient has no other skin complaints today.  Remainder of the HPI, Meds, PMH, Allergies, FH, and SH was reviewed in chart.    No past medical history on file.    Past Surgical History:   Procedure Laterality Date    ROTATOR CUFF REPAIR RT/LT      right shoulder    SURGICAL HISTORY OF -   1973    Parotid tumor--benign        Family History   Problem Relation Age of Onset    Alzheimer Disease Father     Prostate Cancer Father         at age 74    Cardiovascular Paternal Grandfather     Hypertension Brother     Lipids Brother     Lipids Son     Lipids Daughter        Social History     Socioeconomic History    Marital status:      Spouse name: Not on file    Number of children: Not on file    Years of education: Not on file    Highest education level: Not on file   Occupational History     Employer: RETIRED   Tobacco Use    Smoking status: Former     Current packs/day: 0.00     Types: Cigarettes     Quit date: 1984     Years since quittin.7    Smokeless tobacco: Never   Substance and Sexual Activity    Alcohol use: No    Drug use: No    Sexual activity: Yes     Partners: Female     Birth control/protection: Surgical     Comment: wife tubal/hysterectomy   Other Topics Concern    Not on file   Social History Narrative    Not on file     Social Determinants of Health     Financial Resource Strain: Not on file   Food Insecurity: Not on file   Transportation Needs: Not on file   Physical Activity: Not on file   Stress: Not on file   Social Connections: Not on file   Interpersonal Safety: Not on file   Housing Stability: Not on file       Outpatient Encounter Medications as of 2024   Medication Sig Dispense Refill    albuterol (PROVENTIL HFA) 108 (90 Base) MCG/ACT inhaler Inhale 1-2 puffs into the lungs      atorvastatin (LIPITOR) 40 MG tablet Take 40 mg by mouth daily       CHLORPHENIRAMINE MALEATE CR 8 MG OR TBCR 1 TABLET EVERY 8 TO 12 HOURS AS NEEDED      doxazosin (CARDURA) 2 MG tablet Take 2 mg by mouth At Bedtime      famotidine (PEPCID) 40 MG tablet Take 40 mg by mouth daily      FIBER OR 3 capsules three times daily      FLEXERIL 10 MG OR TABS 1 TABLET 3 TIMES DAILY      FLONASE INHA 50 MCG/DOSE NA INHALE 2 SPRAYS IN EACH NOSTRIL ONCE DAILY return to clinic for refills 1 Container 0    fluocinonide (LIDEX) 0.05 % ointment Apply topically 2 times daily > 1 year since seen in [facility]erm Clinic last on 10-. Needs to be seen before any further refills authorized. [phone] to schedule. Thanks. 30 g 0    furosemide (LASIX) 20 MG tablet Take 20 mg by mouth daily      IMITREX 50 MG OR TABS 1 TABLET 1 TIME ONLY,REPEAT AFTER 2 HR AS NEEDED      MULTIDAY VITES OR TABS 1 tablet daily      sertraline (ZOLOFT) 50 MG tablet TAKE ONE TABLET BY MOUTH EVERY DAY      traZODone (DESYREL) 50 MG tablet Take 50 mg by mouth At Bedtime      verapamil ER (CALAN-SR) 120 MG CR tablet Take 2 tablets by mouth every evening       No facility-administered encounter medications on file as of 9/17/2024.             O:   NAD, WDWN, Alert & Oriented, Mood & Affect wnl, Vitals stable   General appearance normal   Vitals stable   Alert, oriented and in no acute distress     Greasy red scaly patches on ears  Nummular eczematous plaqueso n trunk    Stuck on papules and brown macules on trunk and ext   Red papules on trunk  Flesh colored papules on trunk     The remainder of the full exam was normal; the following areas were examined:  conjunctiva/lids, , neck, peripheral vascular system, abdomen, lymph nodes, digits/nails, eccrine and apocrine glands, scalp/hair, face, neck, chest, abdomen, buttocks, back, RUE, LUE, RLE, LLE       Eyes: Conjunctivae/lids:Normal     ENT: Lips, mucosa: normal    MSK:Normal    Cardiovascular: peripheral edema none    Pulm: Breathing Normal    Lymph Nodes: No Head and Neck  Lymphadenopathy     Neuro/Psych: Orientation:Alert and Orientedx3 ; Mood/Affect:normal       A/P:  1. Seborrheic keratosis, lentigo, angioma, dermal nevus  2. Nummular derm   Skin care discussed with patient   Zyrtec daily  Lidex twice daily  3. Seb derm   Loprox bedtime  Lidex daily   It was a pleasure speaking to Stuart Ortega today.  Previous clinic notes and pertinent laboratory tests were reviewed prior to Stuart Ortega's visit.  Signs and Symptoms of skin cancer discussed with patient.  Patient encouraged to perform monthly skin exams.  UV precautions reviewed with patient.  Risks of non-melanoma skin cancer discussed with patient   Return to clinic 3 months

## 2024-12-17 ENCOUNTER — VIRTUAL VISIT (OUTPATIENT)
Dept: DERMATOLOGY | Facility: CLINIC | Age: 74
End: 2024-12-17
Payer: COMMERCIAL

## 2024-12-17 DIAGNOSIS — L30.0 NUMMULAR ECZEMA: Primary | ICD-10-CM

## 2024-12-17 PROCEDURE — 99442 PR PHYSICIAN TELEPHONE EVALUATION 11-20 MIN: CPT | Mod: 93 | Performed by: DERMATOLOGY

## 2024-12-17 NOTE — PROGRESS NOTES
"    Stuart Otrega is a 74 year old male who is being evaluated via a phone  visit.      The patient has been notified of following:     \"This phone  visit will be conducted via a call between you and your physician/provider. We have found that certain health care needs can be provided without the need for an in-person physical exam.  This service lets us provide the care you need with a video conversation.  If a prescription is necessary we can send it directly to your pharmacy.  If lab work is needed we can place an order for that and you can then stop by our lab to have the test done at a later time.    Phone visits are billed at different rates depending on your insurance coverage.  Please reach out to your insurance provider with any questions.    If during the course of the call the physician/provider feels a phone visit is not appropriate, you will not be charged for this service.\"    Patient has given verbal consent for phone visit? Yes    How would you like to obtain your AVS? Yuni    Stuart Ortega is a 74 year old year old male patient here today for follow up nummular derm all clear.  Patient has no other skin complaints today.  Remainder of the HPI, Meds, PMH, Allergies, FH, and SH was reviewed in chart.    No past medical history on file.    Past Surgical History:   Procedure Laterality Date    ROTATOR CUFF REPAIR RT/LT  2005    right shoulder    SURGICAL HISTORY OF -   1973    Parotid tumor--benign        Family History   Problem Relation Age of Onset    Alzheimer Disease Father     Prostate Cancer Father         at age 74    Cardiovascular Paternal Grandfather     Hypertension Brother     Lipids Brother     Lipids Son     Lipids Daughter        Social History     Socioeconomic History    Marital status:      Spouse name: Not on file    Number of children: Not on file    Years of education: Not on file    Highest education level: Not on file   Occupational History     Employer: RETIRED "   Tobacco Use    Smoking status: Former     Current packs/day: 0.00     Types: Cigarettes     Quit date: 1984     Years since quittin.9    Smokeless tobacco: Never   Substance and Sexual Activity    Alcohol use: No    Drug use: No    Sexual activity: Yes     Partners: Female     Birth control/protection: Surgical     Comment: wife tubal/hysterectomy   Other Topics Concern    Not on file   Social History Narrative    Not on file     Social Drivers of Health     Financial Resource Strain: Not on file   Food Insecurity: Not on file   Transportation Needs: Not on file   Physical Activity: Not on file   Stress: Not on file   Social Connections: Not on file   Interpersonal Safety: Not on file   Housing Stability: Not on file       Outpatient Encounter Medications as of 2024   Medication Sig Dispense Refill    albuterol (PROVENTIL HFA) 108 (90 Base) MCG/ACT inhaler Inhale 1-2 puffs into the lungs      atorvastatin (LIPITOR) 40 MG tablet Take 40 mg by mouth daily      CHLORPHENIRAMINE MALEATE CR 8 MG OR TBCR 1 TABLET EVERY 8 TO 12 HOURS AS NEEDED      ciclopirox (LOPROX) 0.77 % cream Apply topically at bedtime. ears 60 g 6    doxazosin (CARDURA) 2 MG tablet Take 2 mg by mouth At Bedtime      famotidine (PEPCID) 40 MG tablet Take 40 mg by mouth daily      FIBER OR 3 capsules three times daily      FLEXERIL 10 MG OR TABS 1 TABLET 3 TIMES DAILY      FLONASE INHA 50 MCG/DOSE NA INHALE 2 SPRAYS IN EACH NOSTRIL ONCE DAILY return to clinic for refills 1 Container 0    fluocinonide (LIDEX) 0.05 % external cream Apply topically 2 times daily. 120 g 6    fluocinonide (LIDEX) 0.05 % ointment Apply topically 2 times daily > 1 year since seen in Derm Clinic last on 10-. Needs to be seen before any further refills authorized. [phone] to schedule. Thanks. 30 g 0    furosemide (LASIX) 20 MG tablet Take 20 mg by mouth daily      IMITREX 50 MG OR TABS 1 TABLET 1 TIME ONLY,REPEAT AFTER 2 HR AS NEEDED      MULTIDAY  VITES OR TABS 1 tablet daily      sertraline (ZOLOFT) 50 MG tablet TAKE ONE TABLET BY MOUTH EVERY DAY      traZODone (DESYREL) 50 MG tablet Take 50 mg by mouth At Bedtime      verapamil ER (CALAN-SR) 120 MG CR tablet Take 2 tablets by mouth every evening       No facility-administered encounter medications on file as of 12/17/2024.             Review Of Systems  Skin: As above  Eyes: negative  Ears/Nose/Throat: negative  Respiratory: No shortness of breath, dyspnea on exertion, cough, or hemoptysis  Cardiovascular: negative  Gastrointestinal: negative  Genitourinary: negative  Musculoskeletal: negative  Neurologic: negative  Psychiatric: negative  Hematologic/Lymphatic/Immunologic: negative  Endocrine: negative      O:   Alert & Orientedx3, Mood & Affect wnl,    General appearance normal   Alert, oriented and in no acute distress    Clear per patient     Pulm: Breathing Normal, talking in normal sentences, no shortness of breath during conversation    Neuro/Psych: Orientation:Alert and Orientedx3 ; Mood/Affect:normal ; no anxiety or depression     A/P:  1.Nummular derm clear  Topicals prn   It was a pleasure speaking to Stuart Ortega today.  Previous clinic  notes and pertinent laboratory tests were reviewed prior to Stuart Ortega's visit.  Skin care regimen reviewed with patient: Eliminate harsh soaps, i.e. Dial, zest, irsih spring; Mild soaps such as Cetaphil or Dove sensitive skin, avoid hot or cold showers, aggressive use of emollients including vanicream, cetaphil or cerave discussed with patient.    Return to clinic prn     Teledermatology information:  - Location of patient: home  - Location of teledermatologist: [facility]   - Reason teledermatology is appropriate: of National Emergency Regarding Coronavirus disease (COVID 19) Outbreak  - The patient's condition can safely be assessed using telemedicine: yes  - Method of transmission: store and forward teledermatology  - In-person dermatology visit  recommendation: no  - Service start time:1015am/pm  - Service end time:1027am/pm  - Date of report: 12/17/24

## 2024-12-17 NOTE — LETTER
"12/17/2024      Stuart Ortega  [address]  [place] [address]      Dear Colleague,    Thank you for referring your patient, Stuart Ortega, to the [facility]. Please see a copy of my visit note below.        Stuart Ortega is a 74 year old male who is being evaluated via a phone  visit.      The patient has been notified of following:     \"This phone  visit will be conducted via a call between you and your physician/provider. We have found that certain health care needs can be provided without the need for an in-person physical exam.  This service lets us provide the care you need with a video conversation.  If a prescription is necessary we can send it directly to your pharmacy.  If lab work is needed we can place an order for that and you can then stop by our lab to have the test done at a later time.    Phone visits are billed at different rates depending on your insurance coverage.  Please reach out to your insurance provider with any questions.    If during the course of the call the physician/provider feels a phone visit is not appropriate, you will not be charged for this service.\"    Patient has given verbal consent for phone visit? Yes    How would you like to obtain your AVS? MyChart    Stuart Ortega is a 74 year old year old male patient here today for follow up nummular derm all clear.  Patient has no other skin complaints today.  Remainder of the HPI, Meds, PMH, Allergies, FH, and SH was reviewed in chart.    No past medical history on file.    Past Surgical History:   Procedure Laterality Date     ROTATOR CUFF REPAIR RT/LT  2005    right shoulder     SURGICAL HISTORY OF -   1973    Parotid tumor--benign        Family History   Problem Relation Age of Onset     Alzheimer Disease Father      Prostate Cancer Father         at age 74     Cardiovascular Paternal Grandfather      Hypertension Brother      Lipids Brother      Lipids Son      Lipids Daughter        Social " History     Socioeconomic History     Marital status:      Spouse name: Not on file     Number of children: Not on file     Years of education: Not on file     Highest education level: Not on file   Occupational History     Employer: RETIRED   Tobacco Use     Smoking status: Former     Current packs/day: 0.00     Types: Cigarettes     Quit date: 1984     Years since quittin.9     Smokeless tobacco: Never   Substance and Sexual Activity     Alcohol use: No     Drug use: No     Sexual activity: Yes     Partners: Female     Birth control/protection: Surgical     Comment: wife tubal/hysterectomy   Other Topics Concern     Not on file   Social History Narrative     Not on file     Social Drivers of Health     Financial Resource Strain: Not on file   Food Insecurity: Not on file   Transportation Needs: Not on file   Physical Activity: Not on file   Stress: Not on file   Social Connections: Not on file   Interpersonal Safety: Not on file   Housing Stability: Not on file       Outpatient Encounter Medications as of 2024   Medication Sig Dispense Refill     albuterol (PROVENTIL HFA) 108 (90 Base) MCG/ACT inhaler Inhale 1-2 puffs into the lungs       atorvastatin (LIPITOR) 40 MG tablet Take 40 mg by mouth daily       CHLORPHENIRAMINE MALEATE CR 8 MG OR TBCR 1 TABLET EVERY 8 TO 12 HOURS AS NEEDED       ciclopirox (LOPROX) 0.77 % cream Apply topically at bedtime. ears 60 g 6     doxazosin (CARDURA) 2 MG tablet Take 2 mg by mouth At Bedtime       famotidine (PEPCID) 40 MG tablet Take 40 mg by mouth daily       FIBER OR 3 capsules three times daily       FLEXERIL 10 MG OR TABS 1 TABLET 3 TIMES DAILY       FLONASE INHA 50 MCG/DOSE NA INHALE 2 SPRAYS IN EACH NOSTRIL ONCE DAILY return to clinic for refills 1 Container 0     fluocinonide (LIDEX) 0.05 % external cream Apply topically 2 times daily. 120 g 6     fluocinonide (LIDEX) 0.05 % ointment Apply topically 2 times daily > 1 year since seen in Derm Clinic  last on 10-. Needs to be seen before any further refills authorized. [phone] to schedule. Thanks. 30 g 0     furosemide (LASIX) 20 MG tablet Take 20 mg by mouth daily       IMITREX 50 MG OR TABS 1 TABLET 1 TIME ONLY,REPEAT AFTER 2 HR AS NEEDED       MULTIDAY VITES OR TABS 1 tablet daily       sertraline (ZOLOFT) 50 MG tablet TAKE ONE TABLET BY MOUTH EVERY DAY       traZODone (DESYREL) 50 MG tablet Take 50 mg by mouth At Bedtime       verapamil ER (CALAN-SR) 120 MG CR tablet Take 2 tablets by mouth every evening       No facility-administered encounter medications on file as of 12/17/2024.             Review Of Systems  Skin: As above  Eyes: negative  Ears/Nose/Throat: negative  Respiratory: No shortness of breath, dyspnea on exertion, cough, or hemoptysis  Cardiovascular: negative  Gastrointestinal: negative  Genitourinary: negative  Musculoskeletal: negative  Neurologic: negative  Psychiatric: negative  Hematologic/Lymphatic/Immunologic: negative  Endocrine: negative      O:   Alert & Orientedx3, Mood & Affect wnl,    General appearance normal   Alert, oriented and in no acute distress    Clear per patient     Pulm: Breathing Normal, talking in normal sentences, no shortness of breath during conversation    Neuro/Psych: Orientation:Alert and Orientedx3 ; Mood/Affect:normal ; no anxiety or depression     A/P:  1.Nummular derm clear  Topicals prn   It was a pleasure speaking to Stuart Ortega today.  Previous clinic  notes and pertinent laboratory tests were reviewed prior to Stuart Ortega's visit.  Skin care regimen reviewed with patient: Eliminate harsh soaps, i.e. Dial, zest, irsih spring; Mild soaps such as Cetaphil or Dove sensitive skin, avoid hot or cold showers, aggressive use of emollients including vanicream, cetaphil or cerave discussed with patient.    Return to clinic prn     Teledermatology information:  - Location of patient: home  - Location of teledermatologist: [facility]   - Reason  teledermatology is appropriate: of National Emergency Regarding Coronavirus disease (COVID 19) Outbreak  - The patient's condition can safely be assessed using telemedicine: yes  - Method of transmission: store and forward teledermatology  - In-person dermatology visit recommendation: no  - Service start time:1015am/pm  - Service end time:1027am/pm  - Date of report: 12/17/24      Again, thank you for allowing me to participate in the care of your patient.        Sincerely,        Sage Gerard MD

## 2025-03-01 ENCOUNTER — HEALTH MAINTENANCE LETTER (OUTPATIENT)
Age: 75
End: 2025-03-01

## 2025-04-19 ENCOUNTER — HEALTH MAINTENANCE LETTER (OUTPATIENT)
Age: 75
End: 2025-04-19